# Patient Record
Sex: FEMALE | Race: WHITE | NOT HISPANIC OR LATINO | Employment: UNEMPLOYED | URBAN - METROPOLITAN AREA
[De-identification: names, ages, dates, MRNs, and addresses within clinical notes are randomized per-mention and may not be internally consistent; named-entity substitution may affect disease eponyms.]

---

## 2017-01-05 RX ORDER — GABAPENTIN 400 MG/1
CAPSULE ORAL
Qty: 90 CAPSULE | Refills: 0 | OUTPATIENT
Start: 2017-01-05

## 2017-03-29 ENCOUNTER — HOSPITAL ENCOUNTER (OUTPATIENT)
Dept: RADIOLOGY | Facility: HOSPITAL | Age: 27
Discharge: HOME | End: 2017-03-29
Attending: FAMILY MEDICINE
Payer: MEDICAID

## 2017-03-29 DIAGNOSIS — R19.7 DIARRHEA: ICD-10-CM

## 2017-03-29 DIAGNOSIS — R10.13 EPIGASTRIC PAIN: ICD-10-CM

## 2017-03-29 PROCEDURE — 74000 XR ABDOMEN AP 1 VIEW: CPT | Mod: TC

## 2017-03-29 PROCEDURE — 74000 XR ABDOMEN AP 1 VIEW: CPT | Mod: 26,,, | Performed by: RADIOLOGY

## 2017-03-29 PROCEDURE — 76705 ECHO EXAM OF ABDOMEN: CPT | Mod: TC

## 2017-03-29 PROCEDURE — 76705 ECHO EXAM OF ABDOMEN: CPT | Mod: 26,,, | Performed by: RADIOLOGY

## 2017-05-18 ENCOUNTER — LAB VISIT (OUTPATIENT)
Dept: LAB | Facility: HOSPITAL | Age: 27
End: 2017-05-18
Attending: FAMILY MEDICINE
Payer: MEDICAID

## 2017-05-18 DIAGNOSIS — M25.50 JOINT PAIN: Primary | ICD-10-CM

## 2017-05-18 PROCEDURE — 86141 C-REACTIVE PROTEIN HS: CPT

## 2017-05-18 PROCEDURE — 86160 COMPLEMENT ANTIGEN: CPT

## 2017-05-18 PROCEDURE — 36415 COLL VENOUS BLD VENIPUNCTURE: CPT

## 2017-05-18 PROCEDURE — 86038 ANTINUCLEAR ANTIBODIES: CPT

## 2017-05-18 PROCEDURE — 86039 ANTINUCLEAR ANTIBODIES (ANA): CPT

## 2017-05-18 PROCEDURE — 86235 NUCLEAR ANTIGEN ANTIBODY: CPT | Mod: 59

## 2017-05-18 PROCEDURE — 86160 COMPLEMENT ANTIGEN: CPT | Mod: 59

## 2017-05-18 PROCEDURE — 86200 CCP ANTIBODY: CPT

## 2017-05-19 LAB
ANA SER QL IF: POSITIVE
ANA TITR SER IF: NORMAL {TITER}
C3 SERPL-MCNC: 123 MG/DL
C4 SERPL-MCNC: 25 MG/DL
CCP AB SER IA-ACNC: 3.9 U/ML
CRP SERPL-MCNC: 8.83 MG/L

## 2017-05-22 LAB
ANTI SM ANTIBODY: 0.71 EU
ANTI SM/RNP ANTIBODY: 0.23 EU
ANTI-SM INTERPRETATION: NEGATIVE
ANTI-SM/RNP INTERPRETATION: NEGATIVE
ANTI-SSA ANTIBODY: 0.59 EU
ANTI-SSA INTERPRETATION: NEGATIVE
ANTI-SSB ANTIBODY: 0.39 EU
ANTI-SSB INTERPRETATION: NEGATIVE
DSDNA AB SER-ACNC: NORMAL [IU]/ML

## 2017-08-08 ENCOUNTER — HOSPITAL ENCOUNTER (OUTPATIENT)
Dept: RADIOLOGY | Facility: HOSPITAL | Age: 27
Discharge: HOME | End: 2017-08-08
Attending: FAMILY MEDICINE
Payer: MEDICAID

## 2017-08-08 DIAGNOSIS — M54.42 LUMBAGO WITH SCIATICA, LEFT SIDE: ICD-10-CM

## 2017-08-08 DIAGNOSIS — G90.09 IDIOPATHIC PERIPHERAL AUTONOMIC NEUROPATHY: ICD-10-CM

## 2017-08-08 DIAGNOSIS — M79.7 FIBROMYALGIA: ICD-10-CM

## 2017-08-08 DIAGNOSIS — M25.562 PAIN IN LEFT KNEE: ICD-10-CM

## 2017-08-08 DIAGNOSIS — G89.28 OTHER CHRONIC POSTOPERATIVE PAIN: ICD-10-CM

## 2017-08-08 DIAGNOSIS — R79.82 ELEVATED C-REACTIVE PROTEIN: ICD-10-CM

## 2017-08-08 DIAGNOSIS — M25.561 PAIN IN RIGHT KNEE: ICD-10-CM

## 2017-08-08 DIAGNOSIS — M25.512 PAIN IN LEFT SHOULDER: ICD-10-CM

## 2017-08-08 PROCEDURE — 73030 X-RAY EXAM OF SHOULDER: CPT | Mod: TC,LT

## 2017-08-08 PROCEDURE — 73030 X-RAY EXAM OF SHOULDER: CPT | Mod: 26,LT,, | Performed by: RADIOLOGY

## 2017-08-08 PROCEDURE — 72100 X-RAY EXAM L-S SPINE 2/3 VWS: CPT | Mod: 26,,, | Performed by: RADIOLOGY

## 2017-08-08 PROCEDURE — 72100 X-RAY EXAM L-S SPINE 2/3 VWS: CPT | Mod: TC

## 2017-08-08 PROCEDURE — 73560 X-RAY EXAM OF KNEE 1 OR 2: CPT | Mod: 50,TC

## 2017-08-08 PROCEDURE — 73560 X-RAY EXAM OF KNEE 1 OR 2: CPT | Mod: 26,50,, | Performed by: RADIOLOGY

## 2017-08-22 ENCOUNTER — OFFICE VISIT (OUTPATIENT)
Dept: OBSTETRICS AND GYNECOLOGY | Facility: CLINIC | Age: 27
End: 2017-08-22
Payer: MEDICAID

## 2017-08-22 VITALS
HEART RATE: 89 BPM | RESPIRATION RATE: 13 BRPM | SYSTOLIC BLOOD PRESSURE: 118 MMHG | WEIGHT: 145 LBS | HEIGHT: 63 IN | DIASTOLIC BLOOD PRESSURE: 76 MMHG | BODY MASS INDEX: 25.69 KG/M2

## 2017-08-22 DIAGNOSIS — Z12.4 CERVICAL CANCER SCREENING: ICD-10-CM

## 2017-08-22 DIAGNOSIS — N90.89 VULVAR LESION: ICD-10-CM

## 2017-08-22 DIAGNOSIS — Z01.419 WELL WOMAN EXAM WITH ROUTINE GYNECOLOGICAL EXAM: Primary | ICD-10-CM

## 2017-08-22 DIAGNOSIS — Z30.011 ENCOUNTER FOR INITIAL PRESCRIPTION OF CONTRACEPTIVE PILLS: ICD-10-CM

## 2017-08-22 DIAGNOSIS — Z11.3 SCREENING EXAMINATION FOR STD (SEXUALLY TRANSMITTED DISEASE): ICD-10-CM

## 2017-08-22 PROCEDURE — 88141 CYTOPATH C/V INTERPRET: CPT | Mod: ,,, | Performed by: PATHOLOGY

## 2017-08-22 PROCEDURE — 87591 N.GONORRHOEAE DNA AMP PROB: CPT

## 2017-08-22 PROCEDURE — 87660 TRICHOMONAS VAGIN DIR PROBE: CPT

## 2017-08-22 PROCEDURE — 87480 CANDIDA DNA DIR PROBE: CPT

## 2017-08-22 PROCEDURE — 88305 TISSUE EXAM BY PATHOLOGIST: CPT | Performed by: PATHOLOGY

## 2017-08-22 PROCEDURE — 99213 OFFICE O/P EST LOW 20 MIN: CPT | Mod: PBBFAC | Performed by: OBSTETRICS & GYNECOLOGY

## 2017-08-22 PROCEDURE — 88142 CYTOPATH C/V THIN LAYER: CPT | Performed by: PATHOLOGY

## 2017-08-22 PROCEDURE — 99395 PREV VISIT EST AGE 18-39: CPT | Mod: S$PBB,,, | Performed by: OBSTETRICS & GYNECOLOGY

## 2017-08-22 PROCEDURE — 99999 PR PBB SHADOW E&M-EST. PATIENT-LVL III: CPT | Mod: PBBFAC,,, | Performed by: OBSTETRICS & GYNECOLOGY

## 2017-08-22 RX ORDER — DEXTROAMPHETAMINE SACCHARATE, AMPHETAMINE ASPARTATE MONOHYDRATE, DEXTROAMPHETAMINE SULFATE AND AMPHETAMINE SULFATE 5; 5; 5; 5 MG/1; MG/1; MG/1; MG/1
20 CAPSULE, EXTENDED RELEASE ORAL EVERY MORNING
COMMUNITY
Start: 2017-08-08

## 2017-08-22 RX ORDER — CALC/MAG/B COMPLEX/D3/HERB 61
TABLET ORAL
COMMUNITY
Start: 2017-05-30 | End: 2017-12-26

## 2017-08-22 RX ORDER — GABAPENTIN 300 MG/1
CAPSULE ORAL
COMMUNITY
Start: 2017-08-11 | End: 2017-12-26

## 2017-08-22 RX ORDER — HYDROCODONE BITARTRATE AND ACETAMINOPHEN 7.5; 325 MG/1; MG/1
TABLET ORAL
COMMUNITY
Start: 2017-08-08 | End: 2017-12-26 | Stop reason: SDUPTHER

## 2017-08-22 RX ORDER — NALOXONE HYDROCHLORIDE 1 MG/ML
INJECTION INTRAMUSCULAR; INTRAVENOUS; SUBCUTANEOUS
COMMUNITY
Start: 2017-08-08 | End: 2018-02-02 | Stop reason: DRUGHIGH

## 2017-08-22 RX ORDER — DIPHENOXYLATE HYDROCHLORIDE AND ATROPINE SULFATE 2.5; .025 MG/1; MG/1
1 TABLET ORAL
COMMUNITY
Start: 2017-08-08

## 2017-08-22 RX ORDER — LEVONORGESTREL / ETHINYL ESTRADIOL AND ETHINYL ESTRADIOL 150-30(84)
1 KIT ORAL DAILY
Qty: 84 EACH | Refills: 3 | Status: SHIPPED | OUTPATIENT
Start: 2017-08-22 | End: 2018-04-09

## 2017-08-22 RX ORDER — PANTOPRAZOLE SODIUM 40 MG/1
TABLET, DELAYED RELEASE ORAL
COMMUNITY
Start: 2017-08-11 | End: 2017-12-26

## 2017-08-22 RX ORDER — HYDROCODONE BITARTRATE AND ACETAMINOPHEN 5; 325 MG/1; MG/1
TABLET ORAL
COMMUNITY
Start: 2017-05-18 | End: 2017-08-22

## 2017-08-22 RX ORDER — SERTRALINE HYDROCHLORIDE 100 MG/1
200 TABLET, FILM COATED ORAL DAILY
COMMUNITY
Start: 2017-08-15 | End: 2018-04-09

## 2017-08-22 NOTE — PROGRESS NOTES
Subjective:    Patient ID: Florence Hinkle is a 26 y.o. female.     Chief Complaint: Annual Well Woman Exam     History of Present Illness:  Florence presents today for Annual Well Woman exam. She describes her menses as irregular. She also states that her menstrual flow is normal with minimal cramping. She denies pelvic pain.  She has some vulvar lesions that are concerning to her that she would like to be evaluated.  She tried valtrex for them without any improvement.  Denies pain or itching at lesions.  She desires STD screening.  She denies breast tenderness, masses, nipple discharge.  She reports no problems with urination. Bowel movements have not significantly changed. She is sexually active. She desires to restart seasonique for contraception.    Past Medical History:   Diagnosis Date    Arthritis     Fibromyalgia     Mental disorder     Neuropathic pain     Lower extremities left hip and knee    Stress     Anxiety     Past Surgical History:   Procedure Laterality Date    KNEE SURGERY  2012    left-arthroscopy shave knee cap and debride    MOUTH SURGERY  11/2006    [place] teeth removed     Review of patient's allergies indicates:   Allergen Reactions    Sulfa (sulfonamide antibiotics) Hives, Nausea And Vomiting and Other (See Comments)     Weakness, dizziness, whole body hurt, needed help getting up    Pcn [penicillins] Other (See Comments)     Since a child     Current Outpatient Prescriptions on File Prior to Visit   Medication Sig Dispense Refill    clonazepam (KLONOPIN) 1 MG tablet Take 1 mg by mouth 2 (two) times daily as needed.       cyclobenzaprine (FLEXERIL) 10 MG tablet TAKE 1 TABLET BY MOUTH 3 TIMES A DAY AS NEEDED FOR MUSCLE SPASM 90 tablet 2    valacyclovir (VALTREX) 1000 MG tablet Take 1 tablet by mouth as needed.      [DISCONTINUED] DEXILANT 60 mg capsule Take 1 capsule by mouth once daily.  2    [DISCONTINUED] HORIZANT 600 mg TbSR Take 1 tablet by mouth every evening.  2     [DISCONTINUED] NEXPLANON 68 mg Impl Every 3 years      [DISCONTINUED] sertraline (ZOLOFT) 50 MG tablet Take 100 mg by mouth nightly.  0     No current facility-administered medications on file prior to visit.      Social History   Substance Use Topics    Smoking status: Current Every Day Smoker     Packs/day: 0.50     Years: 6.00     Types: Cigarettes     Start date: 2008    Smokeless tobacco: Never Used    Alcohol use Yes      Comment: Socially-not every week     Family History   Problem Relation Age of Onset    Breast cancer Neg Hx     Colon cancer Neg Hx     Ovarian cancer Neg Hx      The following portions of the patient's history were reviewed and updated as appropriate: allergies, current medications, past family history, past medical history, past social history, past surgical history and problem list.      Menstrual History:   Patient's last menstrual period was 08/15/2017 (approximate)..     OB History      Para Term  AB Living    0              SAB TAB Ectopic Multiple Live Births                         ROS:   CONSTITUTIONAL: Negative for fever, chills, diaphoresis, weakness, fatigue, weight loss, weight gain  ENT: negative for sore throat, nasal congestion, nasal discharge, epistaxis, tinnitus, hearing loss  EYES: negative for blurry vision, decreased vision, loss of vision, eye pain, diplopia, photophobia, discharge  SKIN: Negative for rash, itching, hives  RESPIRATORY: negative for cough, hemoptysis, shortness of breath, pleuritic chest pain, wheezing  CARDIOVASCULAR: negative for chest pain, dyspnea on exertion, orthopnea, paroxysmal nocturnal dyspnea, edema, palpitations  BREAST: negative for breast  tenderness, breast mass, nipple discharge, or skin changes  GASTROINTESTINAL: negative for abdominal pain, flank pain, nausea, vomiting, diarrhea, constipation, black stool, blood in stool  GENITOURINARY: positive for vulvar lesion;  negative for abnormal vaginal bleeding,  amenorrhea, decreased libido, dysuria, genital sores, hematuria, incontinence, menorrhagia, pelvic pain, urinary frequency, vaginal discharge  HEMATOLOGIC/LYMPHATIC: negative for swollen lymph nodes, bleeding, bruising  MUSCULOSKELETAL: negative for back pain, joint pain, joint stiffness, joint swelling, muscle pain, muscle weakness  NEUROLOGICAL: negative for dizzy/vertigo, headache, focal weakness, numbness/tingling, speech problems, loss of consciousness, confusion, memory loss  BEHAVORIAL/PSYCH: negative for depression, anxiety, bipolar disorder, ADD, substance abuse, schizophrenia  ENDOCRINE: negative for polydipsia/polyuria, palpitations, skin changes, temperature intolerance, unexpected weight changes  ALLERGIC/IMMUNOLOGIC: negative for urticaria, hay fever, angioedema      Objective:    Vital Signs:  Vitals:    08/22/17 1504   BP: 118/76   Pulse: 89   Resp: 13       Physical Exam:  General:  alert; oriented x 4;l well-nourished female   Skin:  Skin color, texture, turgor normal. No rashes or lesions   HEENT:  conjunctivae/corneas clear.    Neck: supple, trachea midline   Respiratory:  clear to auscultation bilaterally   Heart:  regular rate and rhythm, S1, S2 normal, no murmur, click, rub or gallop   Breasts: Symmetrical;  Nipples are protruding and have no nipple discharge. No palpable masses, erythema, skin changes, tenderness, or adenopathy.   Abdomen:  soft, non-tender. Bowel sounds normal. No masses,  no organomegaly   Pelvis: External genitalia: normal general appearance; small collection of flesh colored, nontender left posterior labial/vulvar lesions; hyperpigmented right perineal lesion (chronic as per patient)  Urinary system: urethral meatus normal, bladder nontender  Vaginal: normal mucosa without prolapse or lesions  Cervix: normal appearance; nontender  Uterus: normal single, nontender  Adnexa: normal bimanual exam; nontender; no palpable masses   Extremities: Normal ROM; no edema, no cyanosis    Neurologial: Normal strength and tone. No focal numbness or weakness. Reflexes 2+ and equal.   Psychiatric: normal mood, speech, dress, and thought processes     Procedure:  Left vulvar lesions prepped with hibiclens and anesthetized with 1.5 cc of 1% lidocaine.  Scalpel used to excise lesions which were then sent to pathology.  Silver nitrate used for hemostasis.  Pt tolerated well.    Assessment:      1. Well woman exam with routine gynecological exam    2. Cervical cancer screening    3. Screening examination for STD (sexually transmitted disease)    4. Vulvar lesion    5. Encounter for initial prescription of contraceptive pills          Plan:      Well woman exam with routine gynecological exam    Cervical cancer screening  -     Liquid-based pap smear, screening    Screening examination for STD (sexually transmitted disease)  -     HIV-1 and HIV-2 antibodies; Future; Expected date: 08/22/2017  -     Hepatitis panel, acute; Future; Expected date: 08/22/2017  -     RPR; Future; Expected date: 08/22/2017  -     Herpes simplex type 1 & 2 IgM,Herpes IgM; Future; Expected date: 08/22/2017  -     Herpes simplex type 1&2 IgG,Herpes titer; Future; Expected date: 08/22/2017  -     Vaginosis Screen by DNA Probe  -     C. trachomatis/N. gonorrhoeae by AMP DNA Cervicovaginal    Vulvar lesion  -     Tissue Specimen To Pathology, Obstetrics/Gynecology    Encounter for initial prescription of contraceptive pills  -     L norgest/e.estradiol-e.estrad 0.15 mg-30 mcg (84)/10 mcg (7) 3MPk; Take 1 tablet by mouth once daily.  Dispense: 84 each; Refill: 3        COUNSELING:  Florence was counseled on use and side-effects of various contraceptive measures, A.C.O.G. Pap guidelines and recommendations for yearly pelvic exams in addition to recommendations for monthly self breast exams; to see her PCP for other health maintenance.

## 2017-08-24 LAB
C TRACH DNA SPEC QL NAA+PROBE: NOT DETECTED
CANDIDA RRNA VAG QL PROBE: NEGATIVE
G VAGINALIS RRNA GENITAL QL PROBE: NEGATIVE
N GONORRHOEA DNA SPEC QL NAA+PROBE: NOT DETECTED
T VAGINALIS RRNA GENITAL QL PROBE: NEGATIVE

## 2017-12-26 ENCOUNTER — PROCEDURE VISIT (OUTPATIENT)
Dept: OBSTETRICS AND GYNECOLOGY | Facility: CLINIC | Age: 27
End: 2017-12-26
Payer: MEDICAID

## 2017-12-26 VITALS
HEIGHT: 63 IN | BODY MASS INDEX: 25.87 KG/M2 | WEIGHT: 146 LBS | HEART RATE: 71 BPM | SYSTOLIC BLOOD PRESSURE: 110 MMHG | DIASTOLIC BLOOD PRESSURE: 68 MMHG | RESPIRATION RATE: 13 BRPM

## 2017-12-26 DIAGNOSIS — R87.611 ATYPICAL SQUAMOUS CELLS CANNOT EXCLUDE HIGH GRADE SQUAMOUS INTRAEPITHELIAL LESION ON CYTOLOGIC SMEAR OF CERVIX (ASC-H): Primary | ICD-10-CM

## 2017-12-26 DIAGNOSIS — Z01.812 PRE-PROCEDURE LAB EXAM: ICD-10-CM

## 2017-12-26 LAB
B-HCG UR QL: NEGATIVE
CTP QC/QA: YES

## 2017-12-26 PROCEDURE — 57454 BX/CURETT OF CERVIX W/SCOPE: CPT | Mod: PBBFAC | Performed by: OBSTETRICS & GYNECOLOGY

## 2017-12-26 PROCEDURE — 81025 URINE PREGNANCY TEST: CPT | Mod: PBBFAC | Performed by: OBSTETRICS & GYNECOLOGY

## 2017-12-26 PROCEDURE — 88305 TISSUE EXAM BY PATHOLOGIST: CPT | Mod: 26,,, | Performed by: PATHOLOGY

## 2017-12-26 PROCEDURE — 88305 TISSUE EXAM BY PATHOLOGIST: CPT | Performed by: PATHOLOGY

## 2017-12-26 PROCEDURE — 57454 BX/CURETT OF CERVIX W/SCOPE: CPT | Mod: S$PBB,,, | Performed by: OBSTETRICS & GYNECOLOGY

## 2017-12-26 RX ORDER — CETIRIZINE HYDROCHLORIDE 10 MG/1
10 TABLET ORAL NIGHTLY
COMMUNITY
Start: 2017-12-04

## 2017-12-26 RX ORDER — METRONIDAZOLE 500 MG/1
TABLET ORAL
COMMUNITY
Start: 2017-11-16 | End: 2017-12-26

## 2017-12-26 RX ORDER — DEXLANSOPRAZOLE 60 MG/1
60 CAPSULE, DELAYED RELEASE ORAL DAILY
COMMUNITY
Start: 2017-12-04

## 2017-12-26 RX ORDER — HYDROCODONE BITARTRATE AND ACETAMINOPHEN 7.5; 325 MG/15ML; MG/15ML
SOLUTION ORAL
Refills: 0 | COMMUNITY
Start: 2017-11-30 | End: 2018-02-02

## 2017-12-26 NOTE — PROCEDURES
COLPOSCOPY EXAM:     Florence is a  27 y.o. female  who presents for colposcopy following a PAP smear  Done on 17 revealing ASCUS-cannot exclude HSIL.  UPT negative.    TIME OUT PERFORMED.   The abnormal PAP findings were discussed, as well as HPV infection, need for colposcopy and possible biopsies to determine the plan of care, treatments available, the minimal risk of bleeding and infection with colposcopy, and alternatives to colposcopy. She verbalized her understanding and agreed to proceed.      COLPOSCOPIC FINDINGS:  There were no vulvar lesions suggestive of condyloma present.  The cervix was visualized with a speculum. Acetic acid was applied.     Colposcopic exam revealed acetowhite lesion(s) noted at 12-1 o'clock and punctation noted at 8 o'clock     Biopsy was performed at 8 o'clock and 12 o'clock     ECC was performed.    Specimens obtained were submitted to pathology for evaluation.  The speculum was removed. The patient tolerated the procedure well.      Atypical squamous cells cannot exclude high grade squamous intraepithelial lesion on cytologic smear of cervix (ASC-H)  -     Colposcopy W/BIOPSY AND ECC- Today  -     Tissue Specimen To Pathology, Obstetrics/Gynecology  -     Tissue Specimen To Pathology, Obstetrics/Gynecology  -     Tissue Specimen To Pathology, Obstetrics/Gynecology    Pre-procedure lab exam  -     POCT urine pregnancy    Other orders  -     Cancel: Colposcopy; Future      PLAN:    POST COLPOSCOPY COUNSELING:   Manage post colposcopy cramping with NSAIDs, Tylenol..  Avoid anything in vagina (intercourse, douching, tampons) one week after the procedure.  Expect a clumpy blackish vaginal discharge (Monsel's solution) for several days.  Report bleeding heavier than a period, worsening pain, fever > 101.0 F, or foul-smelling vaginal discharge.  HPV vaccine recommended according to FDA age guidelines.  Importance of follow-up stressed.    Counseling lasted approximately 15  minutes and all her questions were answered.    FOLLOW-UP:   Follow up with me: to be determined based upon pathology report.

## 2018-01-04 ENCOUNTER — PATIENT MESSAGE (OUTPATIENT)
Dept: OBSTETRICS AND GYNECOLOGY | Facility: CLINIC | Age: 28
End: 2018-01-04

## 2018-01-04 ENCOUNTER — TELEPHONE (OUTPATIENT)
Dept: OBSTETRICS AND GYNECOLOGY | Facility: CLINIC | Age: 28
End: 2018-01-04

## 2018-01-04 NOTE — TELEPHONE ENCOUNTER
----- Message from Chantel Robbins LPN sent at 1/3/2018  4:33 PM CST -----      ----- Message -----  From: Esperanza Sifuentes MD  Sent: 1/3/2018   4:33 PM  To: [facility] Clinical Staff    Please notify patient that her biopsies revealed mild dysplasia (CIN2) and her endocervical curettage was negative. She will need to be scheduled for a LEEP.

## 2018-01-25 ENCOUNTER — TELEPHONE (OUTPATIENT)
Dept: OBSTETRICS AND GYNECOLOGY | Facility: CLINIC | Age: 28
End: 2018-01-25

## 2018-01-25 NOTE — TELEPHONE ENCOUNTER
I left a message for the patient to call the clinic so I can reschedule her appointment on 2/19/2018 for the LEEP with Dr. Sifuentes. I left a message for the patient to ask for me.

## 2018-02-02 ENCOUNTER — OFFICE VISIT (OUTPATIENT)
Dept: NEUROLOGY | Facility: CLINIC | Age: 28
End: 2018-02-02
Payer: MEDICAID

## 2018-02-02 VITALS
BODY MASS INDEX: 26.49 KG/M2 | HEART RATE: 82 BPM | HEIGHT: 62 IN | DIASTOLIC BLOOD PRESSURE: 66 MMHG | RESPIRATION RATE: 14 BRPM | SYSTOLIC BLOOD PRESSURE: 94 MMHG | WEIGHT: 143.94 LBS

## 2018-02-02 DIAGNOSIS — G89.29 OTHER CHRONIC PAIN: ICD-10-CM

## 2018-02-02 DIAGNOSIS — J34.89 NASAL PAIN: ICD-10-CM

## 2018-02-02 DIAGNOSIS — F07.81 POST CONCUSSION SYNDROME: Primary | ICD-10-CM
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
## 2018-02-02 PROCEDURE — 3008F BODY MASS INDEX DOCD: CPT | Mod: ,,, | Performed by: PSYCHIATRY & NEUROLOGY

## 2018-02-02 PROCEDURE — 99213 OFFICE O/P EST LOW 20 MIN: CPT | Mod: PBBFAC | Performed by: PSYCHIATRY & NEUROLOGY

## 2018-02-02 PROCEDURE — 99204 OFFICE O/P NEW MOD 45 MIN: CPT | Mod: S$PBB,,, | Performed by: PSYCHIATRY & NEUROLOGY

## 2018-02-02 PROCEDURE — 99999 PR PBB SHADOW E&M-EST. PATIENT-LVL III: CPT | Mod: PBBFAC,,, | Performed by: PSYCHIATRY & NEUROLOGY

## 2018-02-02 RX ORDER — HYDROCODONE BITARTRATE AND ACETAMINOPHEN 7.5; 325 MG/1; MG/1
1 TABLET ORAL EVERY 6 HOURS PRN
COMMUNITY
Start: 2018-01-29

## 2018-02-02 RX ORDER — NORTRIPTYLINE HYDROCHLORIDE 10 MG/1
10 CAPSULE ORAL NIGHTLY
Qty: 30 CAPSULE | Refills: 11 | Status: SHIPPED | OUTPATIENT
Start: 2018-02-02 | End: 2018-04-09

## 2018-02-02 RX ORDER — ONDANSETRON 4 MG/1
TABLET, ORALLY DISINTEGRATING ORAL
COMMUNITY
Start: 2018-01-02 | End: 2018-02-02

## 2018-02-02 RX ORDER — MONTELUKAST SODIUM 10 MG/1
10 TABLET ORAL NIGHTLY
COMMUNITY
Start: 2018-01-04

## 2018-02-02 NOTE — PROGRESS NOTES
HPI: Florence Hinkle is a 27 y.o. female known to me in 2014 for history of left leg numbness (hip to foot numbness). Minimal degenerative changes in the L spine.    Prior EMG/NCS for this complaint was normal.     Patient last saw me in 2014  She reports she had a tonsillectomy a month ago.  She states she was weak, not eating well. She a no supper then a few drinks 9 days ago, then woke the next day, hot sweating nauseated and dizzy.  She ambulated to the bathroom and then had an LOC after feeling room spinning on the toilet. She collapsed and hit her nose and had bleeding. She was unconscious for 10 seconds.  She did not report to the ER. She  nose pain and no more bleeding. She had bruising under her left eye and since has felt tired.  She gets dizzy if she moves to fast. She has headaches frontally. These are not severe but improving slowly with time.   She has concentration difficulty.  She does not work/ currently is unemployed.  For symptoms, she uses rare OTC meds.    Her leg pain, which she saw me for prior is worse at times compared to others. She was placed on gabapentin after insurance denied Horizant. She was off all meds for a few years, then represented to PCP for her prior pain- PCP is filling narco for pain which helps pain but not her burning and numbness in the left leg.   She reports + ROMÁN and + inflammatory markers.   Thumb numbness post bowling years ago stopped  She drinks alcohol 1 day per week and usually drinks at least 3 drinks per week.     Review of Systems   Constitutional: Negative for fever.   HENT: Negative for nosebleeds.    Eyes: Negative for double vision.   Respiratory: Negative for hemoptysis.    Cardiovascular: Negative for leg swelling.   Gastrointestinal: Negative for blood in stool.   Genitourinary: Negative for hematuria.   Musculoskeletal: Positive for back pain.        Still has chronic lower back pain   Skin: Negative for rash.   Neurological: Positive for dizziness and  headaches.   Psychiatric/Behavioral: Negative for depression. The patient has insomnia.         Zoloft helps depression and anxiety         Exam:  Gen Appearance, well developed/nourished in no apparent distress  CV: 2+ distal pulses with no edema or swelling  Neuro:  MS: Awake, alert,  Sustains attention. Recent/remote memory intact, Language is full to spontaneous speech/comprehension. Fund of Knowledge is full  CN:  PERRL, Extraoccular movements and visual fields are full. Normal facial strength,  Tongue and Palate are midline and strong. Shoulder Shrug symmetric and strong.  Motor: Normal bulk, tone, no abnormal movements. 5/5 strength bilateral upper/lower extremities with 2+ reflexes and bilateral plantar response  Sensory: symmetric to light touch, pain, temp, and vibration/proprioception. Romberg negative  Cerebellar: Finger-nose,Rapid alternating movements intact  Gait: Normal stance, no ataxia  No discoloration of the foot and no edema or allodynia    Labs: CK repeated with Dr Navarro is normal and ROMÁN profile was normal after initially first positive ROMÁN      Assessment/Plan: Florence Hinkle is a 27 y.o. female known to me in 2014 for history of left leg numbness (hip to foot numbness). Minimal degenerative changes in the L spine.    Prior EMG/NCS for this complaint was normal.   She represents with post concussive syndrome and nasal painafter fall vs syncope after what sounds like dehydration/intoxication  Over 1 week ago  I recommend:     1. CT head and face to be sure no structural lesions after fall  2. She knows to reduce binge drinking and stay hydrated  3. Discussed post concussive symptoms, which should improve over time.   4. Offered her another medication for headache prevention and for there other more chronic pain. Pamelor trial (lower dose given zoloft use) per orders. She is aware take as directed only to avoid serotonin symptoms.   5. PCP is now filling her chronic pain medication for her  chronic pain  6. Previously: Rheumatology consult ruled out Fibromyalgia and suggests Complex regional Pain/ RSD which I think is a good consideration, although she has other areas of pain and lacks any discoloration or allodynia; however, patient could not get block approved by insurance with pain management/ this was for diagnostic purposes. PCP recommended another rheumatology consult given her reported positive ROMÁN noted by PCP per her    RTC in 6 weeks.

## 2018-02-09 ENCOUNTER — HOSPITAL ENCOUNTER (OUTPATIENT)
Dept: RADIOLOGY | Facility: HOSPITAL | Age: 28
Discharge: HOME | End: 2018-02-09
Attending: PSYCHIATRY & NEUROLOGY
Payer: MEDICAID
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
## 2018-02-09 DIAGNOSIS — F07.81 POST CONCUSSION SYNDROME: ICD-10-CM

## 2018-02-09 DIAGNOSIS — J34.89 NASAL PAIN: ICD-10-CM

## 2018-02-09 PROCEDURE — 70486 CT MAXILLOFACIAL W/O DYE: CPT | Mod: TC

## 2018-02-09 PROCEDURE — 70450 CT HEAD/BRAIN W/O DYE: CPT | Mod: 26,,, | Performed by: RADIOLOGY

## 2018-02-09 PROCEDURE — 70450 CT HEAD/BRAIN W/O DYE: CPT | Mod: TC

## 2018-02-09 PROCEDURE — 70486 CT MAXILLOFACIAL W/O DYE: CPT | Mod: 26,,, | Performed by: RADIOLOGY

## 2018-02-26 ENCOUNTER — TELEPHONE (OUTPATIENT)
Dept: OBSTETRICS AND GYNECOLOGY | Facility: CLINIC | Age: 28
End: 2018-02-26

## 2018-02-26 NOTE — TELEPHONE ENCOUNTER
----- Message from Renetta Santiago MA sent at 2/26/2018 10:10 AM CST -----  Contact: self  Florence Hinkle  MRN: [id number]  Home Phone      [phone]  Work Phone      Not on file.  Mobile          [phone]    Patient Care Team:  Freda Baltazar MD as PCP - General (Family Medicine)  OB? No  What phone number can you be reached at?  [phone]  Message:   Needs to discuss procedure that is scheduled for Wednesday.

## 2018-02-26 NOTE — TELEPHONE ENCOUNTER
Patient scheduled for LEEP procedure and inquiring if recovery is about the same as when she had previous cervical biopsy done. Patient instructed that it would be about the same. Patient inquiring if she would be able to return to work the next day only to take computer courses. Patient instructed that she should be able to as long as she feels well enough. Patient verbalized understanding.

## 2018-02-28 ENCOUNTER — PROCEDURE VISIT (OUTPATIENT)
Dept: OBSTETRICS AND GYNECOLOGY | Facility: CLINIC | Age: 28
End: 2018-02-28
Payer: MEDICAID

## 2018-02-28 VITALS
HEART RATE: 80 BPM | DIASTOLIC BLOOD PRESSURE: 70 MMHG | RESPIRATION RATE: 18 BRPM | BODY MASS INDEX: 26.58 KG/M2 | HEIGHT: 63 IN | SYSTOLIC BLOOD PRESSURE: 118 MMHG | WEIGHT: 150 LBS

## 2018-02-28 DIAGNOSIS — R87.610 PAP SMEAR WITH ATYPICAL SQUAMOUS CELLS, CANNOT EXCLUDE HIGH GRADE SQUAMOUS INTRAEPITHELIAL LESION (ASC-H): ICD-10-CM

## 2018-02-28 DIAGNOSIS — R10.2 VAGINAL PAIN: ICD-10-CM

## 2018-02-28 DIAGNOSIS — R68.82 DECREASED LIBIDO: ICD-10-CM

## 2018-02-28 DIAGNOSIS — N89.8 VAGINAL DISCHARGE: ICD-10-CM

## 2018-02-28 DIAGNOSIS — N76.0 ACUTE VAGINITIS: ICD-10-CM

## 2018-02-28 DIAGNOSIS — N87.1 MODERATE DYSPLASIA OF CERVIX (CIN II): Primary | ICD-10-CM

## 2018-02-28 DIAGNOSIS — Z01.812 PRE-PROCEDURE LAB EXAM: ICD-10-CM

## 2018-02-28 LAB
B-HCG UR QL: NEGATIVE
CTP QC/QA: YES

## 2018-02-28 PROCEDURE — 87491 CHLMYD TRACH DNA AMP PROBE: CPT

## 2018-02-28 PROCEDURE — 87480 CANDIDA DNA DIR PROBE: CPT

## 2018-02-28 PROCEDURE — 99213 OFFICE O/P EST LOW 20 MIN: CPT | Mod: S$PBB,,, | Performed by: OBSTETRICS & GYNECOLOGY

## 2018-02-28 PROCEDURE — 81025 URINE PREGNANCY TEST: CPT | Mod: PBBFAC | Performed by: OBSTETRICS & GYNECOLOGY

## 2018-03-01 LAB
C TRACH DNA SPEC QL NAA+PROBE: NOT DETECTED
CANDIDA RRNA VAG QL PROBE: NEGATIVE
G VAGINALIS RRNA GENITAL QL PROBE: NEGATIVE
N GONORRHOEA DNA SPEC QL NAA+PROBE: NOT DETECTED
T VAGINALIS RRNA GENITAL QL PROBE: POSITIVE

## 2018-03-01 NOTE — PROGRESS NOTES
Subjective:    Patient ID: Florence Hinkle is a 27 y.o. y.o. female.     Chief Complaint:   Chief Complaint   Patient presents with    Procedure     LEEP       History of Present Illness   Florence presents today for LEEP secondary to ASCUS, cannot exclude HSIL pap in 8/17 and TYLER II on colposcopic biopsy in 12/17.  She reports she has recently been having vaginal discharge with odor and would like to be evaluated for this.  States she has also noted vaginal pain.   She also reports decreased libido which she is unsure if this is related to all of her medications collectively or simply to her contraception or some other reason.  States she has tried various forms of contraception in the past.  Would like to have her hormone levels checked.     Review of Systems   Genitourinary: Positive for decreased libido, vaginal discharge and vaginal pain.   All other systems reviewed and are negative.        Objective:    Vital Signs:  Vitals:    02/28/18 1546   BP: 118/70   Pulse: 80   Resp: 18       Physical Exam:  General:  alert; oriented; well-nourished female   Skin:  Skin color, texture, turgor normal. No rashes or lesions   Abdomen:  soft, non-tender. Bowel sounds normal. No masses,  no organomegaly   Pelvis: External genitalia: normal general appearance  Urinary system: urethral meatus normal, bladder nontender  Vaginal: normal mucosa without prolapse or lesions; minimal amount of white vaginal discharge present  Cervix: normal appearance grossly       Pt reported severe pain in vagina after speculum insertion and stated she could not proceed with any further examination or procedure at this time.  Reports she was having pain despite having taken Norco 7.5 mg prior to coming to the appointment. Remainder of examination and LEEP procedure aborted.    Assessment:      1. Moderate dysplasia of cervix (TYLER II)    2. Pap smear with atypical squamous cells, cannot exclude high grade squamous intraepithelial lesion (ASC-H)     3. Pre-procedure lab exam    4. Decreased libido    5. Acute vaginitis    6. Vaginal pain    7. Vaginal discharge          Plan:      Moderate dysplasia of cervix (TYLER II)    Pap smear with atypical squamous cells, cannot exclude high grade squamous intraepithelial lesion (ASC-H)    Pre-procedure lab exam  -     POCT urine pregnancy    Decreased libido  -     TSH; Future; Expected date: 02/28/2018  -     Follicle stimulating hormone; Future; Expected date: 02/28/2018  -     Estradiol; Future; Expected date: 02/28/2018  -     Luteinizing hormone; Future; Expected date: 02/28/2018    Acute vaginitis  -     Vaginosis Screen by DNA Probe  -     C. trachomatis/N. gonorrhoeae by AMP DNA Cervix    Vaginal pain  -     Vaginosis Screen by DNA Probe  -     C. trachomatis/N. gonorrhoeae by AMP DNA Cervix    Vaginal discharge  -     Vaginosis Screen by DNA Probe  -     C. trachomatis/N. gonorrhoeae by AMP DNA Cervix    Will follow up on testing and manage accordingly.  Pt to reschedule LEEP after vaginitis resolves.

## 2018-03-02 ENCOUNTER — TELEPHONE (OUTPATIENT)
Dept: OBSTETRICS AND GYNECOLOGY | Facility: CLINIC | Age: 28
End: 2018-03-02

## 2018-03-02 RX ORDER — METRONIDAZOLE 500 MG/1
2000 TABLET ORAL ONCE
Qty: 4 TABLET | Refills: 0 | Status: SHIPPED | OUTPATIENT
Start: 2018-03-02 | End: 2018-03-02

## 2018-03-02 NOTE — TELEPHONE ENCOUNTER
----- Message from Renetta Santiago MA sent at 3/2/2018 10:08 AM CST -----  Contact: self  Florence Hinkle  MRN: [id number]  Home Phone      [phone]  Work Phone      Not on file.  Mobile          [phone]    Patient Care Team:  Freda Baltazar MD as PCP - General (Family Medicine)  OB? No  What phone number can you be reached at?  [phone]  Message:   Would like results of swab.

## 2018-03-02 NOTE — TELEPHONE ENCOUNTER
Patient is requesting result of vaginal cultures. Dr. Sifuentes is out of the office today. Please advise.

## 2018-03-06 DIAGNOSIS — Z20.2 STD EXPOSURE: Primary | ICD-10-CM

## 2018-03-07 DIAGNOSIS — D06.9 SEVERE DYSPLASIA OF CERVIX (CIN III): Primary | ICD-10-CM

## 2018-03-08 ENCOUNTER — TELEPHONE (OUTPATIENT)
Dept: OBSTETRICS AND GYNECOLOGY | Facility: CLINIC | Age: 28
End: 2018-03-08

## 2018-03-22 ENCOUNTER — LAB VISIT (OUTPATIENT)
Dept: LAB | Facility: HOSPITAL | Age: 28
End: 2018-03-22
Attending: OBSTETRICS & GYNECOLOGY
Payer: MEDICAID

## 2018-03-22 DIAGNOSIS — R68.82 DECREASED LIBIDO: ICD-10-CM

## 2018-03-22 DIAGNOSIS — Z20.2 STD EXPOSURE: ICD-10-CM

## 2018-03-22 LAB — TSH SERPL DL<=0.005 MIU/L-ACNC: 0.84 UIU/ML

## 2018-03-22 PROCEDURE — 86694 HERPES SIMPLEX NES ANTBDY: CPT

## 2018-03-22 PROCEDURE — 82670 ASSAY OF TOTAL ESTRADIOL: CPT

## 2018-03-22 PROCEDURE — 86592 SYPHILIS TEST NON-TREP QUAL: CPT

## 2018-03-22 PROCEDURE — 83002 ASSAY OF GONADOTROPIN (LH): CPT

## 2018-03-22 PROCEDURE — 86703 HIV-1/HIV-2 1 RESULT ANTBDY: CPT

## 2018-03-22 PROCEDURE — 83001 ASSAY OF GONADOTROPIN (FSH): CPT

## 2018-03-22 PROCEDURE — 36415 COLL VENOUS BLD VENIPUNCTURE: CPT

## 2018-03-22 PROCEDURE — 84443 ASSAY THYROID STIM HORMONE: CPT

## 2018-03-22 PROCEDURE — 80074 ACUTE HEPATITIS PANEL: CPT

## 2018-03-23 LAB
ESTRADIOL SERPL-MCNC: 19 PG/ML
FSH SERPL-ACNC: 7.7 MIU/ML
HAV IGM SERPL QL IA: NEGATIVE
HBV CORE IGM SERPL QL IA: NEGATIVE
HBV SURFACE AG SERPL QL IA: NEGATIVE
HCV AB SERPL QL IA: NEGATIVE
HIV 1+2 AB+HIV1 P24 AG SERPL QL IA: NEGATIVE
LH SERPL-ACNC: 6.3 MIU/ML
RPR SER QL: NORMAL

## 2018-03-25 LAB — HSV AB, IGM BY EIA: NEGATIVE

## 2018-04-09 ENCOUNTER — HOSPITAL ENCOUNTER (OUTPATIENT)
Dept: PREADMISSION TESTING | Facility: HOSPITAL | Age: 28
Discharge: HOME | End: 2018-04-09
Attending: OBSTETRICS & GYNECOLOGY
Payer: MEDICAID

## 2018-04-09 ENCOUNTER — OFFICE VISIT (OUTPATIENT)
Dept: OBSTETRICS AND GYNECOLOGY | Facility: CLINIC | Age: 28
End: 2018-04-09
Payer: MEDICAID

## 2018-04-09 ENCOUNTER — ANESTHESIA EVENT (OUTPATIENT)
Dept: SURGERY | Facility: HOSPITAL | Age: 28
End: 2018-04-09
Payer: MEDICAID

## 2018-04-09 VITALS
WEIGHT: 153 LBS | BODY MASS INDEX: 27.11 KG/M2 | HEART RATE: 98 BPM | SYSTOLIC BLOOD PRESSURE: 119 MMHG | DIASTOLIC BLOOD PRESSURE: 72 MMHG | HEIGHT: 63 IN | RESPIRATION RATE: 16 BRPM

## 2018-04-09 DIAGNOSIS — Z30.011 ENCOUNTER FOR INITIAL PRESCRIPTION OF CONTRACEPTIVE PILLS: ICD-10-CM

## 2018-04-09 DIAGNOSIS — Z01.818 PREOPERATIVE TESTING: ICD-10-CM

## 2018-04-09 DIAGNOSIS — N87.1 MODERATE DYSPLASIA OF CERVIX (CIN II): Primary | ICD-10-CM

## 2018-04-09 PROCEDURE — 99213 OFFICE O/P EST LOW 20 MIN: CPT | Mod: PBBFAC | Performed by: OBSTETRICS & GYNECOLOGY

## 2018-04-09 PROCEDURE — 99999 PR PBB SHADOW E&M-EST. PATIENT-LVL III: CPT | Mod: PBBFAC,,, | Performed by: OBSTETRICS & GYNECOLOGY

## 2018-04-09 PROCEDURE — 99499 UNLISTED E&M SERVICE: CPT | Mod: S$PBB,,, | Performed by: OBSTETRICS & GYNECOLOGY

## 2018-04-09 RX ORDER — METRONIDAZOLE 500 MG/1
500 TABLET ORAL EVERY 12 HOURS
Qty: 14 TABLET | Refills: 0 | Status: SHIPPED | OUTPATIENT
Start: 2018-04-09 | End: 2018-04-16

## 2018-04-09 RX ORDER — NORGESTIMATE AND ETHINYL ESTRADIOL 0.25-0.035
1 KIT ORAL DAILY
Qty: 84 TABLET | Refills: 3 | Status: SHIPPED | OUTPATIENT
Start: 2018-04-09 | End: 2019-04-09

## 2018-04-09 RX ORDER — IBUPROFEN 800 MG/1
800 TABLET ORAL EVERY 8 HOURS PRN
Qty: 30 TABLET | Refills: 0 | Status: SHIPPED | OUTPATIENT
Start: 2018-04-09 | End: 2019-04-09

## 2018-04-09 NOTE — ANESTHESIA PREPROCEDURE EVALUATION
04/09/2018  Florence Hinkle is a 27 y.o., female.    Anesthesia Evaluation    I have reviewed the Patient Summary Reports.    I have reviewed the Nursing Notes.   I have reviewed the Medications.     Review of Systems  Anesthesia Hx:  No problems with previous Anesthesia    Social:  Smoker, No Alcohol Use    Hematology/Oncology:  Hematology Normal   Oncology Normal     EENT/Dental:EENT/Dental Normal   Cardiovascular:  Cardiovascular Normal Exercise tolerance: good     Pulmonary:  Pulmonary Normal    Renal/:  Renal/ Normal     Hepatic/GI:  Hepatic/GI Normal    Musculoskeletal:  Musculoskeletal Normal    Neurological:   Neuromuscular Disease,    Endocrine:  Endocrine Normal    Dermatological:  Skin Normal    Psych:   Psychiatric History          Physical Exam  General:  Well nourished    Airway/Jaw/Neck:  Airway Findings: Mouth Opening: Normal Tongue: Normal  General Airway Assessment: Adult  Mallampati: II  TM Distance: Normal, at least 6 cm  Jaw/Neck Findings:  Neck ROM: Normal ROM      Dental:  Dental Findings: In tact        Mental Status:  Mental Status Findings:  Cooperative         Anesthesia Plan  Type of Anesthesia, risks & benefits discussed:  Anesthesia Type:  MAC, general  Patient's Preference:   Intra-op Monitoring Plan: standard ASA monitors  Intra-op Monitoring Plan Comments:   Post Op Pain Control Plan: multimodal analgesia  Post Op Pain Control Plan Comments:   Induction:   IV  Beta Blocker:  Patient is not currently on a Beta-Blocker (No further documentation required).       Informed Consent: Patient understands risks and agrees with Anesthesia plan.  Questions answered. Anesthesia consent signed with patient.  ASA Score: 2     Day of Surgery Review of History & Physical: I have interviewed and examined the patient. I have reviewed the patient's H&P dated: 4/11/18. There are no significant  changes.  H&P update referred to the surgeon.         Ready For Surgery From Anesthesia Perspective.

## 2018-04-09 NOTE — DISCHARGE INSTRUCTIONS
Outpatient procedure instructions    Prep Review  Nothing to eat or drink after midnight unless your doctor tells you differently.    Bring your medication in the original containers.   Take medications as instructed by your doctor.    Wear something comfortable that is easy for you to take off and put on.   Do not wear any makeup, jewelry, or body piercings. Leave valuables at home or let your family member keep them for you. Do not bring them to the Surgery area.     Date/Day of Procedure: Wednesday 4/11/18  Arrival Time: 6am       Report to the Emergency Room if asked to arrive at the hospital before 7:00 a.m.   It is not necessary to report earlier than the time you are told.   Ignore any automated/computer generated calls telling to what time to report to the hospital.   Plan to be at the hospital for about 4 hours, however, it could be longer.

## 2018-04-09 NOTE — PROGRESS NOTES
"Subjective:    Patient ID: Florence Hinkle is a 27 y.o. y.o. female.     Chief Complaint:   Chief Complaint   Patient presents with    Pre-op Exam       History of Present Illness   Florence presents today for preoperative counseling prior to planned LEEP in OR.  LEEP scheduled secondary to ASCUS, cannot exclude HSIL pap in 8/17 and TYLER II on colposcopic biopsy in 12/17.  LEEP initially planned for clinic but patient was too anxious to proceed in clinic setting and also testing revealed trichomonas.  She also reports decreased libido and estradiol level was low.  States she has tried a variety of hormonal contraception in the past.  Patient's last menstrual period was 03/11/2018 (approximate)..     Past Medical History:   Diagnosis Date    Abnormal Pap smear of cervix     Arthritis     Fibromyalgia     Mental disorder     Neuropathic pain     Lower extremities left hip and knee    Stress     Anxiety     Past Surgical History:   Procedure Laterality Date    KNEE SURGERY  2012    left-arthroscopy shave knee cap and debride    MOUTH SURGERY  11/2006    [place] teeth removed    TONSILLECTOMY  01/02/2018     Review of patient's allergies indicates:   Allergen Reactions    Sulfa (sulfonamide antibiotics) Hives, Nausea And Vomiting and Other (See Comments)     Weakness, dizziness, whole body hurt, needed help getting up    Trazodone Other (See Comments)     "makes me crazy"    Phenergan [promethazine] Rash     Current Outpatient Prescriptions on File Prior to Visit   Medication Sig Dispense Refill    cetirizine (ZYRTEC) 10 MG tablet Take 10 mg by mouth every evening.       clonazepam (KLONOPIN) 1 MG tablet Take 1 mg by mouth 3 (three) times daily.       cyclobenzaprine (FLEXERIL) 10 MG tablet TAKE 1 TABLET BY MOUTH 3 TIMES A DAY AS NEEDED FOR MUSCLE SPASM 90 tablet 2    DEXILANT 60 mg capsule Take 60 mg by mouth once daily.       dextroamphetamine-amphetamine (ADDERALL XR) 20 MG 24 hr capsule Take 20 mg by " mouth every morning.       diphenoxylate-atropine 2.5-0.025 mg (LOMOTIL) 2.5-0.025 mg per tablet Take 1 tablet by mouth.       hydrocodone-acetaminophen 7.5-325mg (NORCO) 7.5-325 mg per tablet Take 1 tablet by mouth every 6 (six) hours as needed.       montelukast (SINGULAIR) 10 mg tablet Take 10 mg by mouth every evening.       valacyclovir (VALTREX) 1000 MG tablet Take 1 tablet by mouth as needed.      [DISCONTINUED] L norgest/e.estradiol-e.estrad 0.15 mg-30 mcg (84)/10 mcg (7) 3MPk Take 1 tablet by mouth once daily. 84 each 3    [DISCONTINUED] naloxone 2 mg/0.4 mL AtIn Inject as directed daily as needed.      [DISCONTINUED] nortriptyline (PAMELOR) 10 MG capsule Take 1 capsule (10 mg total) by mouth every evening. 30 capsule 11    [DISCONTINUED] sertraline (ZOLOFT) 100 MG tablet Take 200 mg by mouth once daily.        No current facility-administered medications on file prior to visit.      Social History   Substance Use Topics    Smoking status: Current Every Day Smoker     Packs/day: 0.50     Years: 6.00     Types: Cigarettes     Start date: 1/9/2008    Smokeless tobacco: Never Used    Alcohol use Yes      Comment: Socially-not every week     Family History   Problem Relation Age of Onset    Breast cancer Neg Hx     Colon cancer Neg Hx     Ovarian cancer Neg Hx      The following portions of the patient's history were reviewed and updated as appropriate: allergies, current medications, past family history, past medical history, past social history, past surgical history and problem list.      Review of Systems   All other systems reviewed and are negative.        Objective:    Vital Signs:  Vitals:    04/09/18 0847   BP: 119/72   Pulse: 98   Resp: 16       Physical Exam:  General:  alert; normal appearing, well-nourished female   Skin:  Skin color, texture, turgor normal. No rashes or lesions   CV:  PULM:  Abdomen: Regular rate and rhyth,  Clear to auscultation bilaterally   soft, non-tender. Bowel  sounds normal. No masses,  no organomegaly   Pelvis: Deferred         Assessment:      1. Moderate dysplasia of cervix (TYLER II)    2. Preoperative testing    3. Encounter for initial prescription of contraceptive pills          Plan:      Moderate dysplasia of cervix (TYLER II)  -     metroNIDAZOLE (FLAGYL) 500 MG tablet; Take 1 tablet (500 mg total) by mouth every 12 (twelve) hours.  Dispense: 14 tablet; Refill: 0  -     ibuprofen (ADVIL,MOTRIN) 800 MG tablet; Take 1 tablet (800 mg total) by mouth every 8 (eight) hours as needed for Pain (pain/cramping).  Dispense: 30 tablet; Refill: 0  -     Up ad antoinette; Standing  -     POCT glucose; Standing  -     Notify physician if BS > 180 for hysterectomy patients; Standing  -     Chlorhexidine (CHG) 2% Wipes; Standing  -     Notify Physician/Vital Signs Parameters Urine output less than 0.5mL/kg/hr (with indwelling catheter) or 30 mL/hr (without indwelling catheter) or blood glucose greater than 200 mg/dL; Standing  -     Notify physician; Standing  -     Notify Physician - Potential Need of Opioid Reversal; Standing  -     Place in Outpatient; Standing  -     Diet NPO; Standing  -     Place sequential compression device; Standing    Preoperative testing  -     Type & Screen; Future; Expected date: 04/09/2018  -     hCG, quantitative; Future; Expected date: 04/09/2018  -     Urinalysis; Future; Expected date: 04/09/2018    Encounter for initial prescription of contraceptive pills  -     norgestimate-ethinyl estradiol (SPRINTEC, 28,) 0.25-35 mg-mcg per tablet; Take 1 tablet by mouth once daily. Do not take placebo pills.  Start next pack.  Dispense: 84 tablet; Refill: 3    Other orders  -     IP VTE LOW RISK PATIENT; Standing      Counseled on further evaluation and management options.   Pt desires to proceed with LEEP.  Counseled on risks, benefits, alternatives, and potential side effects.   Pt verbalized understanding.  Consents signed.  Will proceed with LEEP pending  acceptable preoperative testing.

## 2018-04-09 NOTE — H&P
"Subjective:    Patient ID: Florence Hinkle is a 27 y.o. y.o. female.     Chief Complaint:   Chief Complaint   Patient presents with    Pre-op Exam       History of Present Illness   Florence presents today for preoperative counseling prior to planned LEEP in OR.  LEEP scheduled secondary to ASCUS, cannot exclude HSIL pap in 8/17 and TYLER II on colposcopic biopsy in 12/17.  LEEP initially planned for clinic but patient was too anxious to proceed in clinic setting and also testing revealed trichomonas.  She also reports decreased libido and estradiol level was low.  States she has tried a variety of hormonal contraception in the past.  Patient's last menstrual period was 03/11/2018 (approximate)..     Past Medical History:   Diagnosis Date    Abnormal Pap smear of cervix     Arthritis     Fibromyalgia     Mental disorder     Neuropathic pain     Lower extremities left hip and knee    Stress     Anxiety     Past Surgical History:   Procedure Laterality Date    KNEE SURGERY  2012    left-arthroscopy shave knee cap and debride    MOUTH SURGERY  11/2006    [place] teeth removed    TONSILLECTOMY  01/02/2018     Review of patient's allergies indicates:   Allergen Reactions    Sulfa (sulfonamide antibiotics) Hives, Nausea And Vomiting and Other (See Comments)     Weakness, dizziness, whole body hurt, needed help getting up    Trazodone Other (See Comments)     "makes me crazy"    Phenergan [promethazine] Rash     Current Outpatient Prescriptions on File Prior to Visit   Medication Sig Dispense Refill    cetirizine (ZYRTEC) 10 MG tablet Take 10 mg by mouth every evening.       clonazepam (KLONOPIN) 1 MG tablet Take 1 mg by mouth 3 (three) times daily.       cyclobenzaprine (FLEXERIL) 10 MG tablet TAKE 1 TABLET BY MOUTH 3 TIMES A DAY AS NEEDED FOR MUSCLE SPASM 90 tablet 2    DEXILANT 60 mg capsule Take 60 mg by mouth once daily.       dextroamphetamine-amphetamine (ADDERALL XR) 20 MG 24 hr capsule Take 20 mg by " mouth every morning.       diphenoxylate-atropine 2.5-0.025 mg (LOMOTIL) 2.5-0.025 mg per tablet Take 1 tablet by mouth.       hydrocodone-acetaminophen 7.5-325mg (NORCO) 7.5-325 mg per tablet Take 1 tablet by mouth every 6 (six) hours as needed.       montelukast (SINGULAIR) 10 mg tablet Take 10 mg by mouth every evening.       valacyclovir (VALTREX) 1000 MG tablet Take 1 tablet by mouth as needed.      [DISCONTINUED] L norgest/e.estradiol-e.estrad 0.15 mg-30 mcg (84)/10 mcg (7) 3MPk Take 1 tablet by mouth once daily. 84 each 3    [DISCONTINUED] naloxone 2 mg/0.4 mL AtIn Inject as directed daily as needed.      [DISCONTINUED] nortriptyline (PAMELOR) 10 MG capsule Take 1 capsule (10 mg total) by mouth every evening. 30 capsule 11    [DISCONTINUED] sertraline (ZOLOFT) 100 MG tablet Take 200 mg by mouth once daily.        No current facility-administered medications on file prior to visit.      Social History   Substance Use Topics    Smoking status: Current Every Day Smoker     Packs/day: 0.50     Years: 6.00     Types: Cigarettes     Start date: 1/9/2008    Smokeless tobacco: Never Used    Alcohol use Yes      Comment: Socially-not every week     Family History   Problem Relation Age of Onset    Breast cancer Neg Hx     Colon cancer Neg Hx     Ovarian cancer Neg Hx      The following portions of the patient's history were reviewed and updated as appropriate: allergies, current medications, past family history, past medical history, past social history, past surgical history and problem list.      Review of Systems   All other systems reviewed and are negative.        Objective:    Vital Signs:  Vitals:    04/09/18 0847   BP: 119/72   Pulse: 98   Resp: 16       Physical Exam:  General:  alert; normal appearing, well-nourished female   Skin:  Skin color, texture, turgor normal. No rashes or lesions   CV:  PULM:  Abdomen: Regular rate and rhyth,  Clear to auscultation bilaterally   soft, non-tender. Bowel  sounds normal. No masses,  no organomegaly   Pelvis: Deferred         Assessment:      1. Moderate dysplasia of cervix (TYLER II)    2. Preoperative testing    3. Encounter for initial prescription of contraceptive pills          Plan:      Moderate dysplasia of cervix (TYLER II)  -     metroNIDAZOLE (FLAGYL) 500 MG tablet; Take 1 tablet (500 mg total) by mouth every 12 (twelve) hours.  Dispense: 14 tablet; Refill: 0  -     ibuprofen (ADVIL,MOTRIN) 800 MG tablet; Take 1 tablet (800 mg total) by mouth every 8 (eight) hours as needed for Pain (pain/cramping).  Dispense: 30 tablet; Refill: 0  -     Up ad antoinette; Standing  -     POCT glucose; Standing  -     Notify physician if BS > 180 for hysterectomy patients; Standing  -     Chlorhexidine (CHG) 2% Wipes; Standing  -     Notify Physician/Vital Signs Parameters Urine output less than 0.5mL/kg/hr (with indwelling catheter) or 30 mL/hr (without indwelling catheter) or blood glucose greater than 200 mg/dL; Standing  -     Notify physician; Standing  -     Notify Physician - Potential Need of Opioid Reversal; Standing  -     Place in Outpatient; Standing  -     Diet NPO; Standing  -     Place sequential compression device; Standing    Preoperative testing  -     Type & Screen; Future; Expected date: 04/09/2018  -     hCG, quantitative; Future; Expected date: 04/09/2018  -     Urinalysis; Future; Expected date: 04/09/2018    Encounter for initial prescription of contraceptive pills  -     norgestimate-ethinyl estradiol (SPRINTEC, 28,) 0.25-35 mg-mcg per tablet; Take 1 tablet by mouth once daily. Do not take placebo pills.  Start next pack.  Dispense: 84 tablet; Refill: 3    Other orders  -     IP VTE LOW RISK PATIENT; Standing      Counseled on further evaluation and management options.   Pt desires to proceed with LEEP.  Counseled on risks, benefits, alternatives, and potential side effects.   Pt verbalized understanding.  Consents signed.  Will proceed with LEEP pending  acceptable preoperative testing.

## 2018-04-10 ENCOUNTER — TELEPHONE (OUTPATIENT)
Dept: OBSTETRICS AND GYNECOLOGY | Facility: CLINIC | Age: 28
End: 2018-04-10

## 2018-04-10 NOTE — TELEPHONE ENCOUNTER
Patient requesting to know if pre-op testing was negative. Patient informed test were negative per patient portal note from Dr. Sifuentes. Patient verbalized understanding with no questions or concerns.

## 2018-04-10 NOTE — TELEPHONE ENCOUNTER
Please call patient before 1.. She is very upset. She has a procedure maikel. Morning. She has questions.

## 2018-04-11 ENCOUNTER — HOSPITAL ENCOUNTER (OUTPATIENT)
Facility: HOSPITAL | Age: 28
Discharge: HOME | End: 2018-04-11
Attending: OBSTETRICS & GYNECOLOGY | Admitting: OBSTETRICS & GYNECOLOGY
Payer: MEDICAID

## 2018-04-11 ENCOUNTER — ANESTHESIA (OUTPATIENT)
Dept: SURGERY | Facility: HOSPITAL | Age: 28
End: 2018-04-11
Payer: MEDICAID

## 2018-04-11 VITALS
TEMPERATURE: 97 F | RESPIRATION RATE: 18 BRPM | DIASTOLIC BLOOD PRESSURE: 69 MMHG | HEART RATE: 95 BPM | SYSTOLIC BLOOD PRESSURE: 103 MMHG | OXYGEN SATURATION: 98 %

## 2018-04-11 DIAGNOSIS — N87.1 MODERATE DYSPLASIA OF CERVIX (CIN II): ICD-10-CM

## 2018-04-11 PROBLEM — R87.610 PAP SMEAR CANNOT EXCLUDE HIGH GRADE SQUAMOUS INTRAEPITHELIAL LESION (ASC-H): Status: ACTIVE | Noted: 2018-04-11

## 2018-04-11 PROCEDURE — 00940 ANES VAGINAL PX NOS: CPT | Performed by: OBSTETRICS & GYNECOLOGY

## 2018-04-11 PROCEDURE — 88305 TISSUE EXAM BY PATHOLOGIST: CPT | Performed by: PATHOLOGY

## 2018-04-11 PROCEDURE — 36000706: Performed by: OBSTETRICS & GYNECOLOGY

## 2018-04-11 PROCEDURE — 57522 CONIZATION OF CERVIX: CPT | Mod: ,,, | Performed by: OBSTETRICS & GYNECOLOGY

## 2018-04-11 PROCEDURE — 36000707: Performed by: OBSTETRICS & GYNECOLOGY

## 2018-04-11 PROCEDURE — 00940 ANES VAGINAL PX NOS: CPT | Mod: QZ,P2 | Performed by: NURSE ANESTHETIST, CERTIFIED REGISTERED

## 2018-04-11 PROCEDURE — 37000008 HC ANESTHESIA 1ST 15 MINUTES: Performed by: OBSTETRICS & GYNECOLOGY

## 2018-04-11 PROCEDURE — 88305 TISSUE EXAM BY PATHOLOGIST: CPT | Mod: 26,,, | Performed by: PATHOLOGY

## 2018-04-11 PROCEDURE — 37000009 HC ANESTHESIA EA ADD 15 MINS: Performed by: OBSTETRICS & GYNECOLOGY

## 2018-04-11 PROCEDURE — 25000003 PHARM REV CODE 250: Performed by: NURSE ANESTHETIST, CERTIFIED REGISTERED

## 2018-04-11 PROCEDURE — 71000033 HC RECOVERY, INTIAL HOUR: Performed by: OBSTETRICS & GYNECOLOGY

## 2018-04-11 PROCEDURE — 88307 TISSUE EXAM BY PATHOLOGIST: CPT | Mod: 26,,, | Performed by: PATHOLOGY

## 2018-04-11 PROCEDURE — 63600175 PHARM REV CODE 636 W HCPCS: Performed by: NURSE ANESTHETIST, CERTIFIED REGISTERED

## 2018-04-11 RX ORDER — GLYCOPYRROLATE 0.2 MG/ML
INJECTION INTRAMUSCULAR; INTRAVENOUS
Status: DISCONTINUED | OUTPATIENT
Start: 2018-04-11 | End: 2018-04-11

## 2018-04-11 RX ORDER — HYDROCODONE BITARTRATE AND ACETAMINOPHEN 5; 325 MG/1; MG/1
1 TABLET ORAL EVERY 4 HOURS PRN
Status: DISCONTINUED | OUTPATIENT
Start: 2018-04-11 | End: 2018-04-11 | Stop reason: HOSPADM

## 2018-04-11 RX ORDER — DEXAMETHASONE SODIUM PHOSPHATE 4 MG/ML
INJECTION, SOLUTION INTRA-ARTICULAR; INTRALESIONAL; INTRAMUSCULAR; INTRAVENOUS; SOFT TISSUE
Status: DISCONTINUED | OUTPATIENT
Start: 2018-04-11 | End: 2018-04-11

## 2018-04-11 RX ORDER — PROPOFOL 10 MG/ML
INJECTION, EMULSION INTRAVENOUS
Status: DISCONTINUED | OUTPATIENT
Start: 2018-04-11 | End: 2018-04-11

## 2018-04-11 RX ORDER — ONDANSETRON 2 MG/ML
4 INJECTION INTRAMUSCULAR; INTRAVENOUS EVERY 6 HOURS PRN
Status: DISCONTINUED | OUTPATIENT
Start: 2018-04-11 | End: 2018-04-11 | Stop reason: HOSPADM

## 2018-04-11 RX ORDER — FENTANYL CITRATE 50 UG/ML
INJECTION, SOLUTION INTRAMUSCULAR; INTRAVENOUS
Status: DISCONTINUED | OUTPATIENT
Start: 2018-04-11 | End: 2018-04-11

## 2018-04-11 RX ORDER — KETAMINE HYDROCHLORIDE 100 MG/ML
INJECTION, SOLUTION INTRAMUSCULAR; INTRAVENOUS
Status: DISCONTINUED | OUTPATIENT
Start: 2018-04-11 | End: 2018-04-11

## 2018-04-11 RX ORDER — MIDAZOLAM HYDROCHLORIDE 1 MG/ML
INJECTION, SOLUTION INTRAMUSCULAR; INTRAVENOUS
Status: DISCONTINUED | OUTPATIENT
Start: 2018-04-11 | End: 2018-04-11

## 2018-04-11 RX ORDER — KETOROLAC TROMETHAMINE 30 MG/ML
INJECTION, SOLUTION INTRAMUSCULAR; INTRAVENOUS
Status: DISCONTINUED | OUTPATIENT
Start: 2018-04-11 | End: 2018-04-11

## 2018-04-11 RX ORDER — DIPHENHYDRAMINE HCL 25 MG
25 CAPSULE ORAL EVERY 4 HOURS PRN
Status: DISCONTINUED | OUTPATIENT
Start: 2018-04-11 | End: 2018-04-11 | Stop reason: HOSPADM

## 2018-04-11 RX ORDER — HYDROCODONE BITARTRATE AND ACETAMINOPHEN 10; 325 MG/1; MG/1
1 TABLET ORAL EVERY 4 HOURS PRN
Status: DISCONTINUED | OUTPATIENT
Start: 2018-04-11 | End: 2018-04-11 | Stop reason: HOSPADM

## 2018-04-11 RX ORDER — MEPERIDINE HYDROCHLORIDE 50 MG/ML
50 INJECTION INTRAMUSCULAR; INTRAVENOUS; SUBCUTANEOUS EVERY 4 HOURS PRN
Status: DISCONTINUED | OUTPATIENT
Start: 2018-04-11 | End: 2018-04-11 | Stop reason: HOSPADM

## 2018-04-11 RX ORDER — LIDOCAINE HCL/PF 100 MG/5ML
SYRINGE (ML) INTRAVENOUS
Status: DISCONTINUED | OUTPATIENT
Start: 2018-04-11 | End: 2018-04-11

## 2018-04-11 RX ORDER — AMOXICILLIN 250 MG
1 CAPSULE ORAL 2 TIMES DAILY
Status: DISCONTINUED | OUTPATIENT
Start: 2018-04-11 | End: 2018-04-11 | Stop reason: HOSPADM

## 2018-04-11 RX ORDER — SODIUM CHLORIDE, SODIUM LACTATE, POTASSIUM CHLORIDE, CALCIUM CHLORIDE 600; 310; 30; 20 MG/100ML; MG/100ML; MG/100ML; MG/100ML
INJECTION, SOLUTION INTRAVENOUS CONTINUOUS
Status: DISCONTINUED | OUTPATIENT
Start: 2018-04-11 | End: 2018-04-11 | Stop reason: HOSPADM

## 2018-04-11 RX ORDER — SODIUM CHLORIDE, SODIUM LACTATE, POTASSIUM CHLORIDE, CALCIUM CHLORIDE 600; 310; 30; 20 MG/100ML; MG/100ML; MG/100ML; MG/100ML
INJECTION, SOLUTION INTRAVENOUS CONTINUOUS PRN
Status: DISCONTINUED | OUTPATIENT
Start: 2018-04-11 | End: 2018-04-11

## 2018-04-11 RX ORDER — ONDANSETRON 2 MG/ML
INJECTION INTRAMUSCULAR; INTRAVENOUS
Status: DISCONTINUED | OUTPATIENT
Start: 2018-04-11 | End: 2018-04-11

## 2018-04-11 RX ADMIN — PROPOFOL 50 MG: 10 INJECTION, EMULSION INTRAVENOUS at 08:04

## 2018-04-11 RX ADMIN — FENTANYL CITRATE 50 MCG: 50 INJECTION, SOLUTION INTRAMUSCULAR; INTRAVENOUS at 08:04

## 2018-04-11 RX ADMIN — SODIUM CHLORIDE, SODIUM LACTATE, POTASSIUM CHLORIDE, AND CALCIUM CHLORIDE: .6; .31; .03; .02 INJECTION, SOLUTION INTRAVENOUS at 08:04

## 2018-04-11 RX ADMIN — PROPOFOL 80 MG: 10 INJECTION, EMULSION INTRAVENOUS at 08:04

## 2018-04-11 RX ADMIN — KETAMINE HYDROCHLORIDE 20 MG: 100 INJECTION, SOLUTION, CONCENTRATE INTRAMUSCULAR; INTRAVENOUS at 09:04

## 2018-04-11 RX ADMIN — PROPOFOL 50 MG: 10 INJECTION, EMULSION INTRAVENOUS at 09:04

## 2018-04-11 RX ADMIN — ONDANSETRON 4 MG: 2 INJECTION, SOLUTION INTRAMUSCULAR; INTRAVENOUS at 08:04

## 2018-04-11 RX ADMIN — KETAMINE HYDROCHLORIDE 30 MG: 100 INJECTION, SOLUTION, CONCENTRATE INTRAMUSCULAR; INTRAVENOUS at 08:04

## 2018-04-11 RX ADMIN — GLYCOPYRROLATE 0.2 MG: 0.2 INJECTION INTRAMUSCULAR; INTRAVENOUS at 09:04

## 2018-04-11 RX ADMIN — LIDOCAINE HYDROCHLORIDE 60 MG: 20 INJECTION, SOLUTION INTRAVENOUS at 08:04

## 2018-04-11 RX ADMIN — MIDAZOLAM 4 MG: 1 INJECTION INTRAMUSCULAR; INTRAVENOUS at 08:04

## 2018-04-11 RX ADMIN — KETOROLAC TROMETHAMINE 30 MG: 30 INJECTION, SOLUTION INTRAMUSCULAR; INTRAVENOUS at 09:04

## 2018-04-11 RX ADMIN — DEXAMETHASONE SODIUM PHOSPHATE 8 MG: 4 INJECTION, SOLUTION INTRAMUSCULAR; INTRAVENOUS at 08:04

## 2018-04-11 NOTE — OP NOTE
DATE OF PROCEDURE:  04/11/2018    OPERATION:  LEEP procedure.    PREOPERATIVE DIAGNOSES:  1.  Moderate cervical dysplasia of the cervix.  2.  ASC-H Pap.    POSTOPERATIVE DIAGNOSES:  1.  Moderate cervical dysplasia of the cervix.  2.  ASC-H Pap.    SURGEON:  Esperanza Sifuentes M.D.    ANESTHESIA:  Local/MAC.    COMPLICATIONS:  None.    INDICATION:  Ms. Florence Hinkle is a 27-year-old female with an ASC-H Pap (ASCUS   Pap, cannot rule out high-grade lesion) and moderate cervical dysplasia noted on   colposcopic biopsies.  The patient is requesting LEEP to be done in the OR   secondary to anxiety and pain.    FINDINGS:  There was decreased uptake of Lugol's solution noted at 12 o'clock   and 8 o'clock.    ESTIMATED BLOOD LOSS:  Less than 5 mL.    URINE OUTPUT:  Approximately 50 mL of clear urine drained at the beginning of   the procedure.    PROCEDURE IN DETAIL:  The patient was taken to the Operating Room where   anesthesia was administered (MAC).  She was placed in the dorsal lithotomy   position in adjustable Yellofin stirrups.  A red rubber catheter was used to   drain the patient's bladder under sterile conditions.  Coated speculum was   placed inside the patient's vagina.  Lugol solution was applied to the cervix   and vagina.  Then, 1% lidocaine was injected as a local cervical and   paracervical block.  Loop was selected.  Decreased uptake of Lugol's was noted   at 12 o'clock and 8 o'clock.  LEEP was then performed, in which the areas of   decreased uptake as well as the transformation zone were excised in entirety.    Excellent hemostasis was noted.  Post-LEEP, ECC was performed and sent to   Pathology.  Rollerball was used to coagulate the base of the cervix.  Excellent   hemostasis was noted.  Monsel's was applied.  All instruments were then removed   from the patient's vagina.  Sponge, lap, needle, and instrument counts were   correct x2 and the patient was taken to the Recovery Room, awake, and in sterile    condition.      ELM/GEOVANNI  dd: 04/11/2018 18:12:28 (CDT)  td: 04/11/2018 18:54:11 (CDT)  Doc ID   #[id number]  Job ID #[id number]    CC:

## 2018-04-11 NOTE — H&P (VIEW-ONLY)
"Subjective:    Patient ID: Florence Hinkle is a 27 y.o. y.o. female.     Chief Complaint:   Chief Complaint   Patient presents with    Pre-op Exam       History of Present Illness   Florence presents today for preoperative counseling prior to planned LEEP in OR.  LEEP scheduled secondary to ASCUS, cannot exclude HSIL pap in 8/17 and TYLER II on colposcopic biopsy in 12/17.  LEEP initially planned for clinic but patient was too anxious to proceed in clinic setting and also testing revealed trichomonas.  She also reports decreased libido and estradiol level was low.  States she has tried a variety of hormonal contraception in the past.  Patient's last menstrual period was 03/11/2018 (approximate)..     Past Medical History:   Diagnosis Date    Abnormal Pap smear of cervix     Arthritis     Fibromyalgia     Mental disorder     Neuropathic pain     Lower extremities left hip and knee    Stress     Anxiety     Past Surgical History:   Procedure Laterality Date    KNEE SURGERY  2012    left-arthroscopy shave knee cap and debride    MOUTH SURGERY  11/2006    [place] teeth removed    TONSILLECTOMY  01/02/2018     Review of patient's allergies indicates:   Allergen Reactions    Sulfa (sulfonamide antibiotics) Hives, Nausea And Vomiting and Other (See Comments)     Weakness, dizziness, whole body hurt, needed help getting up    Trazodone Other (See Comments)     "makes me crazy"    Phenergan [promethazine] Rash     Current Outpatient Prescriptions on File Prior to Visit   Medication Sig Dispense Refill    cetirizine (ZYRTEC) 10 MG tablet Take 10 mg by mouth every evening.       clonazepam (KLONOPIN) 1 MG tablet Take 1 mg by mouth 3 (three) times daily.       cyclobenzaprine (FLEXERIL) 10 MG tablet TAKE 1 TABLET BY MOUTH 3 TIMES A DAY AS NEEDED FOR MUSCLE SPASM 90 tablet 2    DEXILANT 60 mg capsule Take 60 mg by mouth once daily.       dextroamphetamine-amphetamine (ADDERALL XR) 20 MG 24 hr capsule Take 20 mg by " mouth every morning.       diphenoxylate-atropine 2.5-0.025 mg (LOMOTIL) 2.5-0.025 mg per tablet Take 1 tablet by mouth.       hydrocodone-acetaminophen 7.5-325mg (NORCO) 7.5-325 mg per tablet Take 1 tablet by mouth every 6 (six) hours as needed.       montelukast (SINGULAIR) 10 mg tablet Take 10 mg by mouth every evening.       valacyclovir (VALTREX) 1000 MG tablet Take 1 tablet by mouth as needed.      [DISCONTINUED] L norgest/e.estradiol-e.estrad 0.15 mg-30 mcg (84)/10 mcg (7) 3MPk Take 1 tablet by mouth once daily. 84 each 3    [DISCONTINUED] naloxone 2 mg/0.4 mL AtIn Inject as directed daily as needed.      [DISCONTINUED] nortriptyline (PAMELOR) 10 MG capsule Take 1 capsule (10 mg total) by mouth every evening. 30 capsule 11    [DISCONTINUED] sertraline (ZOLOFT) 100 MG tablet Take 200 mg by mouth once daily.        No current facility-administered medications on file prior to visit.      Social History   Substance Use Topics    Smoking status: Current Every Day Smoker     Packs/day: 0.50     Years: 6.00     Types: Cigarettes     Start date: 1/9/2008    Smokeless tobacco: Never Used    Alcohol use Yes      Comment: Socially-not every week     Family History   Problem Relation Age of Onset    Breast cancer Neg Hx     Colon cancer Neg Hx     Ovarian cancer Neg Hx      The following portions of the patient's history were reviewed and updated as appropriate: allergies, current medications, past family history, past medical history, past social history, past surgical history and problem list.      Review of Systems   All other systems reviewed and are negative.        Objective:    Vital Signs:  Vitals:    04/09/18 0847   BP: 119/72   Pulse: 98   Resp: 16       Physical Exam:  General:  alert; normal appearing, well-nourished female   Skin:  Skin color, texture, turgor normal. No rashes or lesions   CV:  PULM:  Abdomen: Regular rate and rhyth,  Clear to auscultation bilaterally   soft, non-tender. Bowel  sounds normal. No masses,  no organomegaly   Pelvis: Deferred         Assessment:      1. Moderate dysplasia of cervix (TYLER II)    2. Preoperative testing    3. Encounter for initial prescription of contraceptive pills          Plan:      Moderate dysplasia of cervix (TYLER II)  -     metroNIDAZOLE (FLAGYL) 500 MG tablet; Take 1 tablet (500 mg total) by mouth every 12 (twelve) hours.  Dispense: 14 tablet; Refill: 0  -     ibuprofen (ADVIL,MOTRIN) 800 MG tablet; Take 1 tablet (800 mg total) by mouth every 8 (eight) hours as needed for Pain (pain/cramping).  Dispense: 30 tablet; Refill: 0  -     Up ad antoinette; Standing  -     POCT glucose; Standing  -     Notify physician if BS > 180 for hysterectomy patients; Standing  -     Chlorhexidine (CHG) 2% Wipes; Standing  -     Notify Physician/Vital Signs Parameters Urine output less than 0.5mL/kg/hr (with indwelling catheter) or 30 mL/hr (without indwelling catheter) or blood glucose greater than 200 mg/dL; Standing  -     Notify physician; Standing  -     Notify Physician - Potential Need of Opioid Reversal; Standing  -     Place in Outpatient; Standing  -     Diet NPO; Standing  -     Place sequential compression device; Standing    Preoperative testing  -     Type & Screen; Future; Expected date: 04/09/2018  -     hCG, quantitative; Future; Expected date: 04/09/2018  -     Urinalysis; Future; Expected date: 04/09/2018    Encounter for initial prescription of contraceptive pills  -     norgestimate-ethinyl estradiol (SPRINTEC, 28,) 0.25-35 mg-mcg per tablet; Take 1 tablet by mouth once daily. Do not take placebo pills.  Start next pack.  Dispense: 84 tablet; Refill: 3    Other orders  -     IP VTE LOW RISK PATIENT; Standing      Counseled on further evaluation and management options.   Pt desires to proceed with LEEP.  Counseled on risks, benefits, alternatives, and potential side effects.   Pt verbalized understanding.  Consents signed.  Will proceed with LEEP pending  acceptable preoperative testing.

## 2018-04-11 NOTE — BRIEF OP NOTE
Operative Note     SUMMARY     Surgery Date: 4/11/2018     Surgeon(s) and Role:     * Esperanza Sifuentes MD - Primary    Pre-op Diagnosis: Moderate dysplasia of cervix (TYLER III) [D06.9]  ASC-H pap    Post-op Diagnosis:  same    Procedure(s) (LRB):  LEEP PROCEDURE (N/A)    Anesthesia: Local MAC    Findings/Key Components:  Decreased uptake of Lugol's noted at ~ 12:00 and 8:00.    Estimated Blood Loss: < 5 mL         Specimens     Start     Ordered    04/11/18 0900  Specimen to Pathology - Surgery  Once      04/11/18 0919        Discharge Note      SUMMARY     Admit Date: 4/11/2018    Attending Physician: Esperanza Sifuentes MD     Discharge Physician: Esperanza Sifuentes MD    Discharge Date: 4/11/2018     Admit Diagnosis:  Moderate dysplasia of cervix (TYLER III) [D06.9]  ASC-H pap    Final Diagnosis:     Procedure:  Moderate dysplasia of cervix (TYLER III) [D06.9]  ASC-H pap    Disposition: Home or Self Care    Condition: Stable    Hospital Course: Pt presented for scheduled LEEP which she underwent without complication.  Her postoperative course was uncomplicated and she met all discharge criteria on postop day #0.    Patient Instructions:   Current Discharge Medication List      CONTINUE these medications which have NOT CHANGED    Details   cetirizine (ZYRTEC) 10 MG tablet Take 10 mg by mouth every evening.       clonazepam (KLONOPIN) 1 MG tablet Take 1 mg by mouth 3 (three) times daily.       cyclobenzaprine (FLEXERIL) 10 MG tablet TAKE 1 TABLET BY MOUTH 3 TIMES A DAY AS NEEDED FOR MUSCLE SPASM  Qty: 90 tablet, Refills: 2      DEXILANT 60 mg capsule Take 60 mg by mouth once daily.       dextroamphetamine-amphetamine (ADDERALL XR) 20 MG 24 hr capsule Take 20 mg by mouth every morning.       diphenoxylate-atropine 2.5-0.025 mg (LOMOTIL) 2.5-0.025 mg per tablet Take 1 tablet by mouth.       hydrocodone-acetaminophen 7.5-325mg (NORCO) 7.5-325 mg per tablet Take 1 tablet by mouth every 6 (six) hours as needed.       ibuprofen  (ADVIL,MOTRIN) 800 MG tablet Take 1 tablet (800 mg total) by mouth every 8 (eight) hours as needed for Pain (pain/cramping).  Qty: 30 tablet, Refills: 0    Associated Diagnoses: Moderate dysplasia of cervix (TYLER II)      metroNIDAZOLE (FLAGYL) 500 MG tablet Take 1 tablet (500 mg total) by mouth every 12 (twelve) hours.  Qty: 14 tablet, Refills: 0    Associated Diagnoses: Moderate dysplasia of cervix (TYLER II)      montelukast (SINGULAIR) 10 mg tablet Take 10 mg by mouth every evening.       norgestimate-ethinyl estradiol (SPRINTEC, 28,) 0.25-35 mg-mcg per tablet Take 1 tablet by mouth once daily. Do not take placebo pills.  Start next pack.  Qty: 84 tablet, Refills: 3    Associated Diagnoses: Encounter for initial prescription of contraceptive pills      valacyclovir (VALTREX) 1000 MG tablet Take 1 tablet by mouth as needed.             Discharge Procedure Orders (must include Diet, Follow-up, Activity)    Discharge Procedure Orders (must include Diet, Follow-up, Activity)  Diet general     Other restrictions (specify):   Order Comments: No lifting >20lbs, pelvic rest, no driving while on narcotics     Call MD for:  temperature >100.4     Call MD for:  persistent nausea and vomiting     Call MD for:  severe uncontrolled pain     Call MD for:  difficulty breathing, headache or visual disturbances     Call MD for:  redness, tenderness, or signs of infection (pain, swelling, redness, odor or green/yellow discharge around incision site)     Call MD for:  hives     Call MD for:  persistent dizziness or light-headedness     Call MD for:  extreme fatigue     Call MD for:     Wound care routine (specify)   Order Comments: Wound care routine:   1. Keep clean and dry  2. Showers only  3. No creams/ointment  4. Remove soiled sterisrips          Follow-up Information     Esperanza Sifuentes MD In 4 weeks.    Specialty:  Obstetrics  Why:  postop check  Contact information:  [address] [place] [id number] [phone]

## 2018-04-11 NOTE — TRANSFER OF CARE
Anesthesia Transfer of Care Note    Patient: Florence Hinkle    Procedure(s) Performed: Procedure(s) (LRB):  LEEP PROCEDURE (N/A)    Patient location: PACU    Anesthesia Type: general    Transport from OR: Transported from OR on 6-10 L/min O2 by face mask with adequate spontaneous ventilation    Post pain: adequate analgesia    Post assessment: no apparent anesthetic complications and tolerated procedure well    Post vital signs: stable    Level of consciousness: sedated    Nausea/Vomiting: no nausea/vomiting    Complications: none    Transfer of care protocol was followed      Last vitals:   Visit Vitals  /69 (BP Location: Right arm, Patient Position: Lying)   Pulse 95   Temp 36.1 °C (97 °F)   Resp 18   LMP 03/13/2018   SpO2 98%   Breastfeeding? No

## 2018-04-11 NOTE — INTERVAL H&P NOTE
Past Medical History:  Past Medical History:   Diagnosis Date    Abnormal Pap smear of cervix     Arthritis     Fibromyalgia     Mental disorder     Neuropathic pain     Lower extremities left hip and knee    Stress     Anxiety       Past Surgical History:   Procedure Laterality Date    KNEE SURGERY  2012    left-arthroscopy shave knee cap and debride    MOUTH SURGERY  11/2006    [place] teeth removed    TONSILLECTOMY  01/02/2018       Social History   Substance Use Topics    Smoking status: Current Every Day Smoker     Packs/day: 0.50     Years: 6.00     Types: Cigarettes     Start date: 1/9/2008    Smokeless tobacco: Never Used    Alcohol use Yes      Comment: Socially-not every week       Family History   Problem Relation Age of Onset    Breast cancer Neg Hx     Colon cancer Neg Hx     Ovarian cancer Neg Hx        Outpatient Prescriptions Marked as Taking for the 4/11/18 encounter (Hospital Encounter)   Medication Sig Dispense Refill    cetirizine (ZYRTEC) 10 MG tablet Take 10 mg by mouth every evening.       clonazepam (KLONOPIN) 1 MG tablet Take 1 mg by mouth 3 (three) times daily.       cyclobenzaprine (FLEXERIL) 10 MG tablet TAKE 1 TABLET BY MOUTH 3 TIMES A DAY AS NEEDED FOR MUSCLE SPASM 90 tablet 2    DEXILANT 60 mg capsule Take 60 mg by mouth once daily.       dextroamphetamine-amphetamine (ADDERALL XR) 20 MG 24 hr capsule Take 20 mg by mouth every morning.       diphenoxylate-atropine 2.5-0.025 mg (LOMOTIL) 2.5-0.025 mg per tablet Take 1 tablet by mouth.       hydrocodone-acetaminophen 7.5-325mg (NORCO) 7.5-325 mg per tablet Take 1 tablet by mouth every 6 (six) hours as needed.       ibuprofen (ADVIL,MOTRIN) 800 MG tablet Take 1 tablet (800 mg total) by mouth every 8 (eight) hours as needed for Pain (pain/cramping). 30 tablet 0    metroNIDAZOLE (FLAGYL) 500 MG tablet Take 1 tablet (500 mg total) by mouth every 12 (twelve) hours. 14 tablet 0    montelukast (SINGULAIR) 10 mg tablet  "Take 10 mg by mouth every evening.       norgestimate-ethinyl estradiol (SPRINTEC, 28,) 0.25-35 mg-mcg per tablet Take 1 tablet by mouth once daily. Do not take placebo pills.  Start next pack. 84 tablet 3    valacyclovir (VALTREX) 1000 MG tablet Take 1 tablet by mouth as needed.         Review of patient's allergies indicates:   Allergen Reactions    Sulfa (sulfonamide antibiotics) Hives, Nausea And Vomiting and Other (See Comments)     Weakness, dizziness, whole body hurt, needed help getting up    Trazodone Other (See Comments)     "makes me crazy"    Phenergan [promethazine] Rash       The patient has been examined and the H&P has been reviewed:  I concur with the findings and no changes have occurred since H&P was written.    Patient cleared for Anesthesia: General/MAC    Anesthesia/Surgery risks, benefits and alternative options discussed and understood by patient/family.    Active Hospital Problems    Diagnosis  POA    Moderate dysplasia of cervix (TYLER II) [N87.1]  Yes      Resolved Hospital Problems    Diagnosis Date Resolved POA   No resolved problems to display.               "

## 2018-04-11 NOTE — DISCHARGE INSTRUCTIONS
Instructions given per Dr Sifuentes  Easy day  Watch for bleeding,  Notify Dr Sifuentes for large amount of bleeding  No driving, smoking, and drinking alcohol  For 8-10 hours

## 2018-04-11 NOTE — ANESTHESIA POSTPROCEDURE EVALUATION
Anesthesia Post Evaluation    Patient: Florence Hinkle    Procedure(s) Performed: Procedure(s) (LRB):  LEEP PROCEDURE (N/A)    Final Anesthesia Type: general  Patient location during evaluation: PACU  Patient participation: Yes- Able to Participate  Level of consciousness: awake and alert, oriented and awake  Post-procedure vital signs: reviewed and stable  Pain management: adequate  Airway patency: patent  PONV status at discharge: No PONV  Anesthetic complications: no      Cardiovascular status: blood pressure returned to baseline, hemodynamically stable and stable  Respiratory status: unassisted, spontaneous ventilation and room air  Hydration status: euvolemic  Follow-up not needed.        Visit Vitals  /69 (BP Location: Right arm, Patient Position: Lying)   Pulse 95   Temp 36.1 °C (97 °F)   Resp 18   LMP 03/13/2018   SpO2 98%   Breastfeeding? No       Pain/Adeline Score: Pain Assessment Performed: Yes (4/11/2018  9:55 AM)  Presence of Pain: denies (4/11/2018  9:55 AM)  Adeline Score: 10 (4/11/2018  9:55 AM)

## 2021-05-19 ENCOUNTER — LAB VISIT (OUTPATIENT)
Dept: LAB | Facility: HOSPITAL | Age: 31
End: 2021-05-19
Attending: STUDENT IN AN ORGANIZED HEALTH CARE EDUCATION/TRAINING PROGRAM
Payer: COMMERCIAL

## 2021-05-19 ENCOUNTER — OFFICE VISIT (OUTPATIENT)
Dept: OBSTETRICS AND GYNECOLOGY | Facility: CLINIC | Age: 31
End: 2021-05-19
Payer: COMMERCIAL

## 2021-05-19 VITALS
BODY MASS INDEX: 31.04 KG/M2 | SYSTOLIC BLOOD PRESSURE: 118 MMHG | HEART RATE: 78 BPM | DIASTOLIC BLOOD PRESSURE: 82 MMHG | HEIGHT: 63 IN | WEIGHT: 175.19 LBS | RESPIRATION RATE: 12 BRPM | OXYGEN SATURATION: 100 %

## 2021-05-19 DIAGNOSIS — Z01.419 ENCNTR FOR GYN EXAM (GENERAL) (ROUTINE) W/O ABN FINDINGS: ICD-10-CM

## 2021-05-19 DIAGNOSIS — Z12.4 CERVICAL CANCER SCREENING: ICD-10-CM

## 2021-05-19 DIAGNOSIS — Z30.014 ENCOUNTER FOR INITIAL PRESCRIPTION OF INTRAUTERINE CONTRACEPTIVE DEVICE (IUD): ICD-10-CM

## 2021-05-19 DIAGNOSIS — Z01.419 ENCNTR FOR GYN EXAM (GENERAL) (ROUTINE) W/O ABN FINDINGS: Primary | ICD-10-CM

## 2021-05-19 PROCEDURE — 3008F PR BODY MASS INDEX (BMI) DOCUMENTED: ICD-10-PCS | Mod: CPTII,S$GLB,, | Performed by: STUDENT IN AN ORGANIZED HEALTH CARE EDUCATION/TRAINING PROGRAM

## 2021-05-19 PROCEDURE — 88175 CYTOPATH C/V AUTO FLUID REDO: CPT | Performed by: PATHOLOGY

## 2021-05-19 PROCEDURE — 99999 PR PBB SHADOW E&M-NEW PATIENT-LVL III: CPT | Mod: PBBFAC,,, | Performed by: STUDENT IN AN ORGANIZED HEALTH CARE EDUCATION/TRAINING PROGRAM

## 2021-05-19 PROCEDURE — 99385 PR PREVENTIVE VISIT,NEW,18-39: ICD-10-PCS | Mod: S$GLB,,, | Performed by: STUDENT IN AN ORGANIZED HEALTH CARE EDUCATION/TRAINING PROGRAM

## 2021-05-19 PROCEDURE — 1126F PR PAIN SEVERITY QUANTIFIED, NO PAIN PRESENT: ICD-10-PCS | Mod: S$GLB,,, | Performed by: STUDENT IN AN ORGANIZED HEALTH CARE EDUCATION/TRAINING PROGRAM

## 2021-05-19 PROCEDURE — 3008F BODY MASS INDEX DOCD: CPT | Mod: CPTII,S$GLB,, | Performed by: STUDENT IN AN ORGANIZED HEALTH CARE EDUCATION/TRAINING PROGRAM

## 2021-05-19 PROCEDURE — 87340 HEPATITIS B SURFACE AG IA: CPT | Performed by: STUDENT IN AN ORGANIZED HEALTH CARE EDUCATION/TRAINING PROGRAM

## 2021-05-19 PROCEDURE — 87491 CHLMYD TRACH DNA AMP PROBE: CPT | Mod: 59 | Performed by: STUDENT IN AN ORGANIZED HEALTH CARE EDUCATION/TRAINING PROGRAM

## 2021-05-19 PROCEDURE — 87481 CANDIDA DNA AMP PROBE: CPT | Mod: 59 | Performed by: STUDENT IN AN ORGANIZED HEALTH CARE EDUCATION/TRAINING PROGRAM

## 2021-05-19 PROCEDURE — 36415 COLL VENOUS BLD VENIPUNCTURE: CPT | Performed by: STUDENT IN AN ORGANIZED HEALTH CARE EDUCATION/TRAINING PROGRAM

## 2021-05-19 PROCEDURE — 99385 PREV VISIT NEW AGE 18-39: CPT | Mod: S$GLB,,, | Performed by: STUDENT IN AN ORGANIZED HEALTH CARE EDUCATION/TRAINING PROGRAM

## 2021-05-19 PROCEDURE — 99999 PR PBB SHADOW E&M-NEW PATIENT-LVL III: ICD-10-PCS | Mod: PBBFAC,,, | Performed by: STUDENT IN AN ORGANIZED HEALTH CARE EDUCATION/TRAINING PROGRAM

## 2021-05-19 PROCEDURE — 88141 CYTOPATH C/V INTERPRET: CPT | Mod: ,,, | Performed by: PATHOLOGY

## 2021-05-19 PROCEDURE — 86592 SYPHILIS TEST NON-TREP QUAL: CPT | Performed by: STUDENT IN AN ORGANIZED HEALTH CARE EDUCATION/TRAINING PROGRAM

## 2021-05-19 PROCEDURE — 87591 N.GONORRHOEAE DNA AMP PROB: CPT | Performed by: STUDENT IN AN ORGANIZED HEALTH CARE EDUCATION/TRAINING PROGRAM

## 2021-05-19 PROCEDURE — 1126F AMNT PAIN NOTED NONE PRSNT: CPT | Mod: S$GLB,,, | Performed by: STUDENT IN AN ORGANIZED HEALTH CARE EDUCATION/TRAINING PROGRAM

## 2021-05-19 PROCEDURE — 86803 HEPATITIS C AB TEST: CPT | Performed by: STUDENT IN AN ORGANIZED HEALTH CARE EDUCATION/TRAINING PROGRAM

## 2021-05-19 PROCEDURE — 86703 HIV-1/HIV-2 1 RESULT ANTBDY: CPT | Performed by: STUDENT IN AN ORGANIZED HEALTH CARE EDUCATION/TRAINING PROGRAM

## 2021-05-19 PROCEDURE — 87624 HPV HI-RISK TYP POOLED RSLT: CPT | Performed by: STUDENT IN AN ORGANIZED HEALTH CARE EDUCATION/TRAINING PROGRAM

## 2021-05-19 PROCEDURE — 88141 PR  CYTOPATH CERV/VAG INTERPRET: ICD-10-PCS | Mod: ,,, | Performed by: PATHOLOGY

## 2021-05-19 RX ORDER — METHOCARBAMOL 500 MG/1
750 TABLET, FILM COATED ORAL 4 TIMES DAILY
COMMUNITY

## 2021-05-19 RX ORDER — DULOXETIN HYDROCHLORIDE 30 MG/1
CAPSULE, DELAYED RELEASE ORAL
COMMUNITY

## 2021-05-20 LAB
BACTERIAL VAGINOSIS DNA: NEGATIVE
C TRACH DNA SPEC QL NAA+PROBE: NOT DETECTED
CANDIDA GLABRATA DNA: NEGATIVE
CANDIDA KRUSEI DNA: NEGATIVE
CANDIDA RRNA VAG QL PROBE: NEGATIVE
HBV SURFACE AG SERPL QL IA: NEGATIVE
HCV AB SERPL QL IA: NEGATIVE
HIV 1+2 AB+HIV1 P24 AG SERPL QL IA: NEGATIVE
N GONORRHOEA DNA SPEC QL NAA+PROBE: NOT DETECTED
T VAGINALIS RRNA GENITAL QL PROBE: NEGATIVE

## 2021-05-21 LAB — RPR SER QL: NORMAL

## 2021-05-25 LAB
HPV HR 12 DNA SPEC QL NAA+PROBE: NEGATIVE
HPV16 AG SPEC QL: NEGATIVE
HPV18 DNA SPEC QL NAA+PROBE: NEGATIVE

## 2021-06-16 ENCOUNTER — PROCEDURE VISIT (OUTPATIENT)
Dept: OBSTETRICS AND GYNECOLOGY | Facility: CLINIC | Age: 31
End: 2021-06-16
Payer: COMMERCIAL

## 2021-06-16 VITALS
BODY MASS INDEX: 31.01 KG/M2 | HEART RATE: 82 BPM | RESPIRATION RATE: 12 BRPM | SYSTOLIC BLOOD PRESSURE: 114 MMHG | WEIGHT: 175 LBS | OXYGEN SATURATION: 100 % | HEIGHT: 63 IN | DIASTOLIC BLOOD PRESSURE: 72 MMHG

## 2021-06-16 DIAGNOSIS — Z30.430 ENCOUNTER FOR IUD INSERTION: Primary | ICD-10-CM

## 2021-06-16 DIAGNOSIS — Z01.812 PRE-PROCEDURE LAB EXAM: ICD-10-CM

## 2021-06-16 LAB
B-HCG UR QL: NEGATIVE
CTP QC/QA: YES

## 2021-06-16 PROCEDURE — 58300 INSERT INTRAUTERINE DEVICE: CPT | Mod: S$GLB,,, | Performed by: STUDENT IN AN ORGANIZED HEALTH CARE EDUCATION/TRAINING PROGRAM

## 2021-06-16 PROCEDURE — 99499 NO LOS: ICD-10-PCS | Mod: S$GLB,,, | Performed by: STUDENT IN AN ORGANIZED HEALTH CARE EDUCATION/TRAINING PROGRAM

## 2021-06-16 PROCEDURE — 81025 URINE PREGNANCY TEST: CPT | Mod: S$GLB,,, | Performed by: STUDENT IN AN ORGANIZED HEALTH CARE EDUCATION/TRAINING PROGRAM

## 2021-06-16 PROCEDURE — 81025 POCT URINE PREGNANCY: ICD-10-PCS | Mod: S$GLB,,, | Performed by: STUDENT IN AN ORGANIZED HEALTH CARE EDUCATION/TRAINING PROGRAM

## 2021-06-16 PROCEDURE — 99499 UNLISTED E&M SERVICE: CPT | Mod: S$GLB,,, | Performed by: STUDENT IN AN ORGANIZED HEALTH CARE EDUCATION/TRAINING PROGRAM

## 2021-06-16 PROCEDURE — 58300 INSERTION OF IUD: ICD-10-PCS | Mod: S$GLB,,, | Performed by: STUDENT IN AN ORGANIZED HEALTH CARE EDUCATION/TRAINING PROGRAM

## 2022-05-27 ENCOUNTER — OFFICE VISIT (OUTPATIENT)
Dept: OBSTETRICS AND GYNECOLOGY | Facility: CLINIC | Age: 32
End: 2022-05-27

## 2022-05-27 VITALS
HEIGHT: 63 IN | DIASTOLIC BLOOD PRESSURE: 62 MMHG | SYSTOLIC BLOOD PRESSURE: 110 MMHG | WEIGHT: 173.19 LBS | BODY MASS INDEX: 30.69 KG/M2

## 2022-05-27 DIAGNOSIS — Z11.3 SCREEN FOR STD (SEXUALLY TRANSMITTED DISEASE): Primary | ICD-10-CM

## 2022-05-27 PROCEDURE — 99213 PR OFFICE/OUTPT VISIT, EST, LEVL III, 20-29 MIN: ICD-10-PCS | Mod: S$PBB,,, | Performed by: OBSTETRICS & GYNECOLOGY

## 2022-05-27 PROCEDURE — 99213 OFFICE O/P EST LOW 20 MIN: CPT | Mod: PBBFAC | Performed by: OBSTETRICS & GYNECOLOGY

## 2022-05-27 PROCEDURE — 87591 N.GONORRHOEAE DNA AMP PROB: CPT | Performed by: OBSTETRICS & GYNECOLOGY

## 2022-05-27 PROCEDURE — 99999 PR PBB SHADOW E&M-EST. PATIENT-LVL III: ICD-10-PCS | Mod: PBBFAC,,, | Performed by: OBSTETRICS & GYNECOLOGY

## 2022-05-27 PROCEDURE — 99999 PR PBB SHADOW E&M-EST. PATIENT-LVL III: CPT | Mod: PBBFAC,,, | Performed by: OBSTETRICS & GYNECOLOGY

## 2022-05-27 PROCEDURE — 87491 CHLMYD TRACH DNA AMP PROBE: CPT | Performed by: OBSTETRICS & GYNECOLOGY

## 2022-05-27 PROCEDURE — 99213 OFFICE O/P EST LOW 20 MIN: CPT | Mod: S$PBB,,, | Performed by: OBSTETRICS & GYNECOLOGY

## 2022-05-27 NOTE — PROGRESS NOTES
Subjective:       Patient ID: Florence Hinkle is a 31 y.o. female.    Chief Complaint:  Vulvar Rash and STD CHECK      History of Present Illness  Patient presents complaining of an external vaginal rash for about 1 week.  She has been treated with Vagistat on states it is improved.  She denies any noticeable itching.  Patient has also had some cramping midline for the last approximately 1 week.  Patient is almost due to start her menstrual cycle.  Patient also request STD screening.  Counseling was done    Menstrual History:  OB History        0    Para        Term                AB        Living           SAB        IAB        Ectopic        Multiple        Live Births                    Menarche age:  Patient's last menstrual period was 2022.         Review of Systems  Review of Systems   Constitutional: Negative for activity change, appetite change, chills, diaphoresis, fatigue, fever and unexpected weight change.   HENT: Negative for congestion, dental problem, drooling, ear discharge, ear pain, facial swelling, hearing loss, mouth sores, nosebleeds, postnasal drip, rhinorrhea, sinus pressure, sneezing, sore throat, tinnitus, trouble swallowing and voice change.    Eyes: Negative for photophobia, pain, discharge, redness, itching and visual disturbance.   Respiratory: Negative for apnea, cough, choking, chest tightness, shortness of breath, wheezing and stridor.    Cardiovascular: Negative for chest pain, palpitations and leg swelling.   Gastrointestinal: Negative for abdominal distention, abdominal pain, anal bleeding, blood in stool, constipation, diarrhea, nausea, rectal pain and vomiting.   Endocrine: Negative for cold intolerance, heat intolerance, polydipsia, polyphagia and polyuria.   Genitourinary: Positive for genital sores and pelvic pain. Negative for decreased urine volume, difficulty urinating, dyspareunia, dysuria, enuresis, flank pain, frequency, hematuria, menstrual  problem, urgency, vaginal bleeding, vaginal discharge and vaginal pain.   Musculoskeletal: Negative for arthralgias, back pain, gait problem, joint swelling, myalgias, neck pain and neck stiffness.   Skin: Negative for color change, pallor, rash and wound.   Allergic/Immunologic: Negative for environmental allergies, food allergies and immunocompromised state.   Neurological: Negative for dizziness, tremors, seizures, syncope, facial asymmetry, speech difficulty, weakness, light-headedness, numbness and headaches.   Hematological: Negative for adenopathy. Does not bruise/bleed easily.   Psychiatric/Behavioral: Negative for agitation, behavioral problems, confusion, decreased concentration, dysphoric mood, hallucinations, self-injury, sleep disturbance and suicidal ideas. The patient is not nervous/anxious and is not hyperactive.            Objective:      Physical Exam  Vitals and nursing note reviewed. Exam conducted with a chaperone present.   Abdominal:      Hernia: There is no hernia in the left inguinal area or right inguinal area.   Genitourinary:     General: Normal vulva.      Labia:         Right: No rash, tenderness, lesion or injury.         Left: No rash, tenderness, lesion or injury.       Urethra: No prolapse, urethral pain, urethral swelling or urethral lesion.      Vagina: No signs of injury and foreign body. No vaginal discharge, erythema, tenderness or bleeding.      Cervix: No cervical motion tenderness, discharge or friability.      Uterus: Not deviated, not enlarged, not fixed and not tender.       Adnexa:         Right: No mass, tenderness or fullness.          Left: No mass, tenderness or fullness.        Rectum: No external hemorrhoid.       Lymphadenopathy:      Lower Body: No right inguinal adenopathy. No left inguinal adenopathy.             Assessment:        1. Screen for STD (sexually transmitted disease)                Plan:         Florence was seen today for vulvar rash and std  check.    Diagnoses and all orders for this visit:    Screen for STD (sexually transmitted disease)  -     C. trachomatis/N. gonorrhoeae by AMP DNA [facility]; Cervix

## 2022-05-30 LAB
C TRACH DNA SPEC QL NAA+PROBE: NOT DETECTED
N GONORRHOEA DNA SPEC QL NAA+PROBE: NOT DETECTED

## 2024-04-24 ENCOUNTER — TELEPHONE (OUTPATIENT)
Dept: OBSTETRICS AND GYNECOLOGY | Facility: CLINIC | Age: 34
End: 2024-04-24
Payer: COMMERCIAL

## 2024-04-24 NOTE — TELEPHONE ENCOUNTER
Contacted pt.  States she had Mirena inserted 06/16/2021.  Pt aware Mirena is good for 8years.  She is scheduled for annual on 06/03/2024

## 2024-04-24 NOTE — TELEPHONE ENCOUNTER
----- Message from Meredith Marcus sent at 4/24/2024  9:37 AM CDT -----  Contact: Self  Florence Hinkle  MRN: [id number]  Home Phone      [phone]  Work Phone      Not on file.  Mobile          [phone]    Patient Care Team:  Freda Baltazar MD as PCP - General (Family Medicine)  OB? No  What phone number can you be reached at? [phone]  Message: has questions about IUD removal

## 2024-06-03 ENCOUNTER — OFFICE VISIT (OUTPATIENT)
Dept: OBSTETRICS AND GYNECOLOGY | Facility: CLINIC | Age: 34
End: 2024-06-03
Payer: COMMERCIAL

## 2024-06-03 VITALS
SYSTOLIC BLOOD PRESSURE: 122 MMHG | HEIGHT: 63 IN | OXYGEN SATURATION: 98 % | RESPIRATION RATE: 16 BRPM | HEART RATE: 91 BPM | BODY MASS INDEX: 33.63 KG/M2 | DIASTOLIC BLOOD PRESSURE: 80 MMHG | WEIGHT: 189.81 LBS

## 2024-06-03 DIAGNOSIS — Z01.419 ENCNTR FOR GYN EXAM (GENERAL) (ROUTINE) W/O ABN FINDINGS: Primary | ICD-10-CM

## 2024-06-03 PROCEDURE — 3074F SYST BP LT 130 MM HG: CPT | Mod: CPTII,S$GLB,, | Performed by: STUDENT IN AN ORGANIZED HEALTH CARE EDUCATION/TRAINING PROGRAM

## 2024-06-03 PROCEDURE — 3079F DIAST BP 80-89 MM HG: CPT | Mod: CPTII,S$GLB,, | Performed by: STUDENT IN AN ORGANIZED HEALTH CARE EDUCATION/TRAINING PROGRAM

## 2024-06-03 PROCEDURE — 3008F BODY MASS INDEX DOCD: CPT | Mod: CPTII,S$GLB,, | Performed by: STUDENT IN AN ORGANIZED HEALTH CARE EDUCATION/TRAINING PROGRAM

## 2024-06-03 PROCEDURE — 99999 PR PBB SHADOW E&M-EST. PATIENT-LVL III: CPT | Mod: PBBFAC,,, | Performed by: STUDENT IN AN ORGANIZED HEALTH CARE EDUCATION/TRAINING PROGRAM

## 2024-06-03 PROCEDURE — 99395 PREV VISIT EST AGE 18-39: CPT | Mod: S$GLB,,, | Performed by: STUDENT IN AN ORGANIZED HEALTH CARE EDUCATION/TRAINING PROGRAM

## 2024-06-03 PROCEDURE — 1160F RVW MEDS BY RX/DR IN RCRD: CPT | Mod: CPTII,S$GLB,, | Performed by: STUDENT IN AN ORGANIZED HEALTH CARE EDUCATION/TRAINING PROGRAM

## 2024-06-03 PROCEDURE — 1159F MED LIST DOCD IN RCRD: CPT | Mod: CPTII,S$GLB,, | Performed by: STUDENT IN AN ORGANIZED HEALTH CARE EDUCATION/TRAINING PROGRAM

## 2024-06-03 RX ORDER — PANTOPRAZOLE SODIUM 40 MG/1
1 TABLET, DELAYED RELEASE ORAL DAILY
COMMUNITY

## 2024-06-03 NOTE — PROGRESS NOTES
Subjective:    Patient ID: Florence Hinkle is a 33 y.o. y.o. female.     Chief Complaint: Annual Well Woman Exam     History of Present Illness:  Florence presents today for Annual Well Woman exam. She describes her menses as  occasional spotting .She denies pelvic pain.  She denies breast tenderness, masses, nipple discharge. She denies difficulty with urination or bowel movements. She is not currently sexually active. Contraception is by IUD.      Menstrual History:   No LMP recorded (lmp unknown)..     OB History          0    Para        Term                AB        Living             SAB        IAB        Ectopic        Multiple        Live Births                     The following portions of the patient's history were reviewed and updated as appropriate: allergies, current medications, past family history, past medical history, past social history, past surgical history, and problem list.    ROS:   CONSTITUTIONAL: Negative for fever, chills, diaphoresis, weakness, fatigue, weight loss, weight gain  ENT: negative for sore throat, nasal congestion, nasal discharge, epistaxis, tinnitus, hearing loss  EYES: negative for blurry vision, decreased vision, loss of vision, eye pain, diplopia, photophobia, discharge  SKIN: Negative for rash, itching, hives  RESPIRATORY: negative for cough, hemoptysis, shortness of breath, pleuritic chest pain, wheezing  CARDIOVASCULAR: negative for chest pain, dyspnea on exertion, orthopnea, paroxysmal nocturnal dyspnea, edema, palpitations  BREAST: negative for breast  tenderness, breast mass, nipple discharge, or skin changes  GASTROINTESTINAL: negative for abdominal pain, flank pain, nausea, vomiting, diarrhea, constipation, black stool, blood in stool  GENITOURINARY: negative for abnormal vaginal bleeding, amenorrhea, decreased libido, dysuria, genital sores, hematuria, incontinence, menorrhagia, pelvic pain, urinary frequency, vaginal  discharge  HEMATOLOGIC/LYMPHATIC: negative for swollen lymph nodes, bleeding, bruising  MUSCULOSKELETAL: negative for back pain, joint pain, joint stiffness, joint swelling, muscle pain, muscle weakness  NEUROLOGICAL: negative for dizzy/vertigo, headache, focal weakness, numbness/tingling, speech problems, loss of consciousness, confusion, memory loss  BEHAVORIAL/PSYCH: negative for anxiety, depression, psychosis  ENDOCRINE: negative for polydipsia/polyuria, palpitations, skin changes, temperature intolerance, unexpected weight changes  ALLERGIC/IMMUNOLOGIC: negative for urticaria, hay fever, angioedema      Objective:    Vital Signs:  Vitals:    06/03/24 1414   BP: 122/80   Pulse: 91   Resp: 16       Physical Exam:  General:  alert, cooperative, no distress   Skin:  Skin color, texture, turgor normal. No rashes or lesions   HEENT:  conjunctivae/corneas clear. PERRL.   Neck: supple, trachea midline, no adenopathy or thyromegally   Respiratory:  Normal effort   Breasts:  no discharge, erythema, tenderness, or palpable masses; no axillary lymphadenopathy   Abdomen:  soft, nontender, no palpable masses   Pelvis: External genitalia: normal general appearance  Urinary system: urethral meatus normal, bladder nontender  Vaginal: normal mucosa without prolapse or lesions  Cervix: normal appearance, IUD string visualized  Uterus: normal size, shape, position  Adnexa: normal size, nontender bilaterally   Extremities: Normal ROM; no edema, no cyanosis   Neurologial: Normal strength and tone. No focal numbness or weakness.   Psychiatric: normal mood, speech, dress, and thought processes         Assessment:       Healthy female exam.     1. Encntr for gyn exam (general) (routine) w/o abn findings          Plan:      Problem List Items Addressed This Visit    None  Visit Diagnoses       Encntr for gyn exam (general) (routine) w/o abn findings    -  Primary              COUNSELING:  Florence was counseled on STD prevention, use and  side-effects of various contraceptive measures, A.C.O.G. Pap guidelines and recommendations for yearly pelvic exams in addition to recommendations for monthly self breast exams; to see her PCP for other health maintenance.

## 2024-07-30 ENCOUNTER — OFFICE VISIT (OUTPATIENT)
Dept: FAMILY MEDICINE | Facility: CLINIC | Age: 34
End: 2024-07-30
Payer: COMMERCIAL

## 2024-07-30 ENCOUNTER — CLINICAL SUPPORT (OUTPATIENT)
Dept: FAMILY MEDICINE | Facility: CLINIC | Age: 34
End: 2024-07-30
Payer: COMMERCIAL

## 2024-07-30 VITALS
OXYGEN SATURATION: 99 % | SYSTOLIC BLOOD PRESSURE: 106 MMHG | HEIGHT: 63 IN | RESPIRATION RATE: 18 BRPM | WEIGHT: 191 LBS | HEART RATE: 79 BPM | BODY MASS INDEX: 33.84 KG/M2 | DIASTOLIC BLOOD PRESSURE: 62 MMHG

## 2024-07-30 DIAGNOSIS — R05.1 ACUTE COUGH: Primary | ICD-10-CM

## 2024-07-30 DIAGNOSIS — U07.1 COVID-19: ICD-10-CM

## 2024-07-30 DIAGNOSIS — R05.1 ACUTE COUGH: ICD-10-CM

## 2024-07-30 DIAGNOSIS — M25.50 CHRONIC PAIN OF MULTIPLE JOINTS: ICD-10-CM

## 2024-07-30 DIAGNOSIS — F32.1 CURRENT MODERATE EPISODE OF MAJOR DEPRESSIVE DISORDER WITHOUT PRIOR EPISODE: ICD-10-CM

## 2024-07-30 DIAGNOSIS — K21.9 HIATAL HERNIA WITH GERD: ICD-10-CM

## 2024-07-30 DIAGNOSIS — Z00.00 ENCOUNTER FOR SCREENING AND PREVENTATIVE CARE: ICD-10-CM

## 2024-07-30 DIAGNOSIS — Z13.31 POSITIVE DEPRESSION SCREENING: ICD-10-CM

## 2024-07-30 DIAGNOSIS — Z13.6 ENCOUNTER FOR LIPID SCREENING FOR CARDIOVASCULAR DISEASE: ICD-10-CM

## 2024-07-30 DIAGNOSIS — G89.29 CHRONIC PAIN OF MULTIPLE JOINTS: ICD-10-CM

## 2024-07-30 DIAGNOSIS — Z13.220 ENCOUNTER FOR LIPID SCREENING FOR CARDIOVASCULAR DISEASE: ICD-10-CM

## 2024-07-30 DIAGNOSIS — K44.9 HIATAL HERNIA WITH GERD: ICD-10-CM

## 2024-07-30 DIAGNOSIS — Z13.1 DIABETES MELLITUS SCREENING: ICD-10-CM

## 2024-07-30 DIAGNOSIS — Z76.89 ENCOUNTER TO ESTABLISH CARE: Primary | ICD-10-CM

## 2024-07-30 LAB
CTP QC/QA: YES
SARS-COV-2 RDRP RESP QL NAA+PROBE: POSITIVE

## 2024-07-30 PROCEDURE — 3074F SYST BP LT 130 MM HG: CPT | Mod: CPTII,S$GLB,, | Performed by: STUDENT IN AN ORGANIZED HEALTH CARE EDUCATION/TRAINING PROGRAM

## 2024-07-30 PROCEDURE — 87635 SARS-COV-2 COVID-19 AMP PRB: CPT | Mod: QW,S$GLB,, | Performed by: STUDENT IN AN ORGANIZED HEALTH CARE EDUCATION/TRAINING PROGRAM

## 2024-07-30 PROCEDURE — 3078F DIAST BP <80 MM HG: CPT | Mod: CPTII,S$GLB,, | Performed by: STUDENT IN AN ORGANIZED HEALTH CARE EDUCATION/TRAINING PROGRAM

## 2024-07-30 PROCEDURE — 99999 PR PBB SHADOW E&M-EST. PATIENT-LVL III: CPT | Mod: PBBFAC,,, | Performed by: STUDENT IN AN ORGANIZED HEALTH CARE EDUCATION/TRAINING PROGRAM

## 2024-07-30 PROCEDURE — 3008F BODY MASS INDEX DOCD: CPT | Mod: CPTII,S$GLB,, | Performed by: STUDENT IN AN ORGANIZED HEALTH CARE EDUCATION/TRAINING PROGRAM

## 2024-07-30 PROCEDURE — 1159F MED LIST DOCD IN RCRD: CPT | Mod: CPTII,S$GLB,, | Performed by: STUDENT IN AN ORGANIZED HEALTH CARE EDUCATION/TRAINING PROGRAM

## 2024-07-30 PROCEDURE — 99204 OFFICE O/P NEW MOD 45 MIN: CPT | Mod: S$GLB,,, | Performed by: STUDENT IN AN ORGANIZED HEALTH CARE EDUCATION/TRAINING PROGRAM

## 2024-07-30 RX ORDER — FAMOTIDINE 40 MG/1
40 TABLET, FILM COATED ORAL 2 TIMES DAILY
Qty: 60 TABLET | Refills: 5 | Status: SHIPPED | OUTPATIENT
Start: 2024-07-30

## 2024-07-30 RX ORDER — NIRMATRELVIR AND RITONAVIR 300-100 MG
KIT ORAL
Qty: 30 TABLET | Refills: 0 | Status: SHIPPED | OUTPATIENT
Start: 2024-07-30 | End: 2024-08-04

## 2024-07-30 RX ORDER — FAMOTIDINE 40 MG/1
40 TABLET, FILM COATED ORAL 2 TIMES DAILY
COMMUNITY
Start: 2024-07-22 | End: 2024-07-30 | Stop reason: SDUPTHER

## 2024-07-30 RX ORDER — VENLAFAXINE HYDROCHLORIDE 37.5 MG/1
37.5 CAPSULE, EXTENDED RELEASE ORAL DAILY
Qty: 30 CAPSULE | Refills: 11 | Status: SHIPPED | OUTPATIENT
Start: 2024-07-30 | End: 2025-07-30

## 2024-07-30 NOTE — PROGRESS NOTES
Subjective:       Patient ID: Florence Hinkle is a 33 y.o. female.    Chief Complaint: Establish Care (Patient is here today to establish care. )    Pt here to establish care.     She is a daily smoker but is not ready to quit.    She has GERD and hiatal hernia and is on protonix 40mg daily and pepcid 40mg BID. She was seen by GI, reports last EGD within 5 years. She recently had the pepcid added for poor control on protonix alone.    She has fibromyalgia and chronic neuropathic pain. She wants to work. She has to be on her feet for about 9 hours daily. She has pain in her knee and ankles. She was seen by pain management in the past and they did not want her to be on chronic pain medication although she was with her prior PCP in the past.     She has anxiety and depressed mood. She took cymbalta in the past but didn't like the way it made her feel.    Review of Systems   Constitutional:  Negative for chills and fever.   HENT:  Negative for congestion and sore throat.    Respiratory:  Negative for cough and shortness of breath.    Cardiovascular:  Negative for chest pain.   Gastrointestinal:  Negative for abdominal pain, diarrhea, nausea and vomiting.   Genitourinary:  Negative for dysuria and hematuria.   Musculoskeletal:  Positive for arthralgias.   Neurological:  Negative for dizziness, syncope and light-headedness.   Psychiatric/Behavioral:  Negative for confusion.        Objective:      Physical Exam  Vitals reviewed.   Constitutional:       General: She is not in acute distress.  HENT:      Head: Normocephalic and atraumatic.   Eyes:      Conjunctiva/sclera: Conjunctivae normal.   Cardiovascular:      Rate and Rhythm: Normal rate and regular rhythm.      Heart sounds: Normal heart sounds. No murmur heard.  Pulmonary:      Effort: Pulmonary effort is normal. No respiratory distress.      Breath sounds: Normal breath sounds.   Neurological:      General: No focal deficit present.      Mental Status: She is  alert and oriented to person, place, and time.   Psychiatric:         Mood and Affect: Mood normal.         Behavior: Behavior normal.         Assessment:       1. Encounter to establish care    2. Positive depression screening    3. Encounter for lipid screening for cardiovascular disease    4. Chronic pain of multiple joints    5. Diabetes mellitus screening    6. Encounter for screening and preventative care    7. Current moderate episode of major depressive disorder without prior episode    8. Hiatal hernia with GERD    9. Acute cough    10. COVID-19        Plan:           1. Encounter to establish care    2. Positive depression screening  Comments:  I have reviewed the positive depression score which warrants active treatment with psychotherapy and/or medications.    3. Encounter for lipid screening for cardiovascular disease  -     Lipid Panel; Future; Expected date: 07/30/2024    4. Chronic pain of multiple joints  -     CBC Auto Differential; Future; Expected date: 07/30/2024  -     Comprehensive Metabolic Panel; Future; Expected date: 07/30/2024  -     TSH; Future; Expected date: 07/30/2024  -     ROMÁN; Future; Expected date: 07/30/2024  -     Sedimentation rate; Future; Expected date: 07/30/2024  -     C-REACTIVE PROTEIN; Future; Expected date: 07/30/2024    5. Diabetes mellitus screening  -     Hemoglobin A1C; Future; Expected date: 07/30/2024    6. Encounter for screening and preventative care  -     CBC Auto Differential; Future; Expected date: 07/30/2024  -     Comprehensive Metabolic Panel; Future; Expected date: 07/30/2024  -     Lipid Panel; Future; Expected date: 07/30/2024    7. Current moderate episode of major depressive disorder without prior episode  -     venlafaxine (EFFEXOR-XR) 37.5 MG 24 hr capsule; Take 1 capsule (37.5 mg total) by mouth once daily.  Dispense: 30 capsule; Refill: 11    8. Hiatal hernia with GERD  -     famotidine (PEPCID) 40 MG tablet; Take 1 tablet (40 mg total) by mouth  2 (two) times daily.  Dispense: 60 tablet; Refill: 5  -     H. pylori antigen, stool; Future; Expected date: 07/30/2024    9. Acute cough  -     POCT COVID-19 Rapid Screening    10. COVID-19  -     nirmatrelvir-ritonavir (PAXLOVID) 300 mg (150 mg x 2)-100 mg copackaged tablets (EUA); Take 3 tablets by mouth 2 (two) times daily. Each dose contains 2 nirmatrelvir (pink tablets) and 1 ritonavir (white tablet). Take all 3 tablets together  Dispense: 30 tablet; Refill: 0    Covid 19: positive    She would like paxlovid  Labs as ordered  Start effexor   Check h pylori  RTC 1 month or sooner if needed

## 2024-07-30 NOTE — LETTER
July 30, 2024    Florence Hinkle  [address] [facility] [address]             [facility]  [address] [place] [id number]  Phone: [phone]  Fax: [phone]   July 30, 2024     Patient: Florence Hinkle   YOB: 1990   Date of Visit: 7/30/2024       To Whom it May Concern:    Florence Hinkle was seen in my clinic on 7/30/2024. She may return to work on 08/05/2024 .    Please excuse her from any classes or work missed.    If you have any questions or concerns, please don't hesitate to call.    Sincerely,       CAROLEE Tidwell Michael C., MD

## 2024-08-15 ENCOUNTER — LAB VISIT (OUTPATIENT)
Dept: LAB | Facility: HOSPITAL | Age: 34
End: 2024-08-15
Attending: STUDENT IN AN ORGANIZED HEALTH CARE EDUCATION/TRAINING PROGRAM
Payer: COMMERCIAL

## 2024-08-15 DIAGNOSIS — M25.50 CHRONIC PAIN OF MULTIPLE JOINTS: ICD-10-CM

## 2024-08-15 DIAGNOSIS — Z00.00 ENCOUNTER FOR SCREENING AND PREVENTATIVE CARE: ICD-10-CM

## 2024-08-15 DIAGNOSIS — Z13.220 ENCOUNTER FOR LIPID SCREENING FOR CARDIOVASCULAR DISEASE: ICD-10-CM

## 2024-08-15 DIAGNOSIS — Z13.6 ENCOUNTER FOR LIPID SCREENING FOR CARDIOVASCULAR DISEASE: ICD-10-CM

## 2024-08-15 DIAGNOSIS — Z13.1 DIABETES MELLITUS SCREENING: ICD-10-CM

## 2024-08-15 DIAGNOSIS — G89.29 CHRONIC PAIN OF MULTIPLE JOINTS: ICD-10-CM

## 2024-08-15 LAB
ALBUMIN SERPL BCP-MCNC: 3.9 G/DL (ref 3.5–5.2)
ALP SERPL-CCNC: 138 U/L (ref 55–135)
ALT SERPL W/O P-5'-P-CCNC: 27 U/L (ref 10–44)
ANION GAP SERPL CALC-SCNC: 9 MMOL/L (ref 8–16)
AST SERPL-CCNC: 21 U/L (ref 10–40)
BASOPHILS # BLD AUTO: 0.05 K/UL (ref 0–0.2)
BASOPHILS NFR BLD: 0.5 % (ref 0–1.9)
BILIRUB SERPL-MCNC: 0.2 MG/DL (ref 0.1–1)
BUN SERPL-MCNC: 14 MG/DL (ref 6–20)
CALCIUM SERPL-MCNC: 9.4 MG/DL (ref 8.7–10.5)
CHLORIDE SERPL-SCNC: 102 MMOL/L (ref 95–110)
CHOLEST SERPL-MCNC: 183 MG/DL (ref 120–199)
CHOLEST/HDLC SERPL: 4.6 {RATIO} (ref 2–5)
CO2 SERPL-SCNC: 26 MMOL/L (ref 23–29)
CREAT SERPL-MCNC: 0.8 MG/DL (ref 0.5–1.4)
CRP SERPL-MCNC: 7.5 MG/L (ref 0–8.2)
DIFFERENTIAL METHOD BLD: ABNORMAL
EOSINOPHIL # BLD AUTO: 0.1 K/UL (ref 0–0.5)
EOSINOPHIL NFR BLD: 1.4 % (ref 0–8)
ERYTHROCYTE [DISTWIDTH] IN BLOOD BY AUTOMATED COUNT: 11.9 % (ref 11.5–14.5)
ERYTHROCYTE [SEDIMENTATION RATE] IN BLOOD BY WESTERGREN METHOD: 15 MM/HR (ref 0–20)
EST. GFR  (NO RACE VARIABLE): >60 ML/MIN/1.73 M^2
ESTIMATED AVG GLUCOSE: 100 MG/DL (ref 68–131)
GLUCOSE SERPL-MCNC: 91 MG/DL (ref 70–110)
HBA1C MFR BLD: 5.1 % (ref 4–5.6)
HCT VFR BLD AUTO: 41 % (ref 37–48.5)
HDLC SERPL-MCNC: 40 MG/DL (ref 40–75)
HDLC SERPL: 21.9 % (ref 20–50)
HGB BLD-MCNC: 14.6 G/DL (ref 12–16)
IMM GRANULOCYTES # BLD AUTO: 0.03 K/UL (ref 0–0.04)
IMM GRANULOCYTES NFR BLD AUTO: 0.3 % (ref 0–0.5)
LDLC SERPL CALC-MCNC: 130.4 MG/DL (ref 63–159)
LYMPHOCYTES # BLD AUTO: 2.8 K/UL (ref 1–4.8)
LYMPHOCYTES NFR BLD: 28.2 % (ref 18–48)
MCH RBC QN AUTO: 31.1 PG (ref 27–31)
MCHC RBC AUTO-ENTMCNC: 35.6 G/DL (ref 32–36)
MCV RBC AUTO: 87 FL (ref 82–98)
MONOCYTES # BLD AUTO: 0.5 K/UL (ref 0.3–1)
MONOCYTES NFR BLD: 5.2 % (ref 4–15)
NEUTROPHILS # BLD AUTO: 6.3 K/UL (ref 1.8–7.7)
NEUTROPHILS NFR BLD: 64.4 % (ref 38–73)
NONHDLC SERPL-MCNC: 143 MG/DL
NRBC BLD-RTO: 0 /100 WBC
PLATELET # BLD AUTO: 296 K/UL (ref 150–450)
PMV BLD AUTO: 9.6 FL (ref 9.2–12.9)
POTASSIUM SERPL-SCNC: 4.1 MMOL/L (ref 3.5–5.1)
PROT SERPL-MCNC: 7.1 G/DL (ref 6–8.4)
RBC # BLD AUTO: 4.69 M/UL (ref 4–5.4)
SODIUM SERPL-SCNC: 137 MMOL/L (ref 136–145)
TRIGL SERPL-MCNC: 63 MG/DL (ref 30–150)
TSH SERPL DL<=0.005 MIU/L-ACNC: 1.22 UIU/ML (ref 0.4–4)
WBC # BLD AUTO: 9.85 K/UL (ref 3.9–12.7)

## 2024-08-15 PROCEDURE — 86038 ANTINUCLEAR ANTIBODIES: CPT | Performed by: STUDENT IN AN ORGANIZED HEALTH CARE EDUCATION/TRAINING PROGRAM

## 2024-08-15 PROCEDURE — 83036 HEMOGLOBIN GLYCOSYLATED A1C: CPT | Performed by: STUDENT IN AN ORGANIZED HEALTH CARE EDUCATION/TRAINING PROGRAM

## 2024-08-15 PROCEDURE — 85025 COMPLETE CBC W/AUTO DIFF WBC: CPT | Performed by: STUDENT IN AN ORGANIZED HEALTH CARE EDUCATION/TRAINING PROGRAM

## 2024-08-15 PROCEDURE — 80053 COMPREHEN METABOLIC PANEL: CPT | Performed by: STUDENT IN AN ORGANIZED HEALTH CARE EDUCATION/TRAINING PROGRAM

## 2024-08-15 PROCEDURE — 36415 COLL VENOUS BLD VENIPUNCTURE: CPT | Performed by: STUDENT IN AN ORGANIZED HEALTH CARE EDUCATION/TRAINING PROGRAM

## 2024-08-15 PROCEDURE — 85651 RBC SED RATE NONAUTOMATED: CPT | Performed by: STUDENT IN AN ORGANIZED HEALTH CARE EDUCATION/TRAINING PROGRAM

## 2024-08-15 PROCEDURE — 86140 C-REACTIVE PROTEIN: CPT | Performed by: STUDENT IN AN ORGANIZED HEALTH CARE EDUCATION/TRAINING PROGRAM

## 2024-08-15 PROCEDURE — 80061 LIPID PANEL: CPT | Performed by: STUDENT IN AN ORGANIZED HEALTH CARE EDUCATION/TRAINING PROGRAM

## 2024-08-15 PROCEDURE — 84443 ASSAY THYROID STIM HORMONE: CPT | Performed by: STUDENT IN AN ORGANIZED HEALTH CARE EDUCATION/TRAINING PROGRAM

## 2024-08-19 ENCOUNTER — LAB VISIT (OUTPATIENT)
Dept: LAB | Facility: HOSPITAL | Age: 34
End: 2024-08-19
Attending: STUDENT IN AN ORGANIZED HEALTH CARE EDUCATION/TRAINING PROGRAM
Payer: COMMERCIAL

## 2024-08-19 DIAGNOSIS — K44.9 HIATAL HERNIA WITH GERD: ICD-10-CM

## 2024-08-19 DIAGNOSIS — K21.9 HIATAL HERNIA WITH GERD: ICD-10-CM

## 2024-08-19 LAB — ANA SER QL IF: NORMAL

## 2024-08-19 PROCEDURE — 87338 HPYLORI STOOL AG IA: CPT | Performed by: STUDENT IN AN ORGANIZED HEALTH CARE EDUCATION/TRAINING PROGRAM

## 2024-09-25 ENCOUNTER — OFFICE VISIT (OUTPATIENT)
Dept: FAMILY MEDICINE | Facility: CLINIC | Age: 34
End: 2024-09-25
Payer: COMMERCIAL

## 2024-09-25 ENCOUNTER — TELEPHONE (OUTPATIENT)
Dept: FAMILY MEDICINE | Facility: CLINIC | Age: 34
End: 2024-09-25

## 2024-09-25 ENCOUNTER — CLINICAL SUPPORT (OUTPATIENT)
Dept: FAMILY MEDICINE | Facility: CLINIC | Age: 34
End: 2024-09-25
Payer: COMMERCIAL

## 2024-09-25 VITALS
HEIGHT: 63 IN | BODY MASS INDEX: 32.81 KG/M2 | SYSTOLIC BLOOD PRESSURE: 106 MMHG | OXYGEN SATURATION: 97 % | HEART RATE: 101 BPM | RESPIRATION RATE: 16 BRPM | WEIGHT: 185.19 LBS | DIASTOLIC BLOOD PRESSURE: 62 MMHG

## 2024-09-25 DIAGNOSIS — E66.9 CLASS 1 OBESITY WITH SERIOUS COMORBIDITY AND BODY MASS INDEX (BMI) OF 32.0 TO 32.9 IN ADULT, UNSPECIFIED OBESITY TYPE: ICD-10-CM

## 2024-09-25 DIAGNOSIS — R35.0 URINARY FREQUENCY: ICD-10-CM

## 2024-09-25 DIAGNOSIS — G47.30 SLEEP APNEA, UNSPECIFIED TYPE: ICD-10-CM

## 2024-09-25 DIAGNOSIS — F32.1 CURRENT MODERATE EPISODE OF MAJOR DEPRESSIVE DISORDER WITHOUT PRIOR EPISODE: Primary | ICD-10-CM

## 2024-09-25 DIAGNOSIS — D17.23 LIPOMA OF RIGHT LOWER EXTREMITY: ICD-10-CM

## 2024-09-25 DIAGNOSIS — K44.9 HIATAL HERNIA WITH GERD: ICD-10-CM

## 2024-09-25 DIAGNOSIS — R06.83 LOUD SNORING: ICD-10-CM

## 2024-09-25 DIAGNOSIS — K21.9 HIATAL HERNIA WITH GERD: ICD-10-CM

## 2024-09-25 DIAGNOSIS — R40.0 DAYTIME SOMNOLENCE: ICD-10-CM

## 2024-09-25 PROBLEM — E66.811 CLASS 1 OBESITY WITH SERIOUS COMORBIDITY AND BODY MASS INDEX (BMI) OF 32.0 TO 32.9 IN ADULT: Status: ACTIVE | Noted: 2024-09-25

## 2024-09-25 LAB
BACTERIA SPEC CULT: NORMAL /HPF
BILIRUB SERPL-MCNC: NORMAL MG/DL
BLOOD URINE, POC: NORMAL
CASTS: NORMAL
COLOR, POC UA: YELLOW
CRYSTALS: NORMAL
GLUCOSE UR QL STRIP: NORMAL
KETONES UR QL STRIP: NORMAL
LEUKOCYTE ESTERASE URINE, POC: NORMAL
NITRITE, POC UA: NORMAL
PH, POC UA: 5.5
PROTEIN, POC: NORMAL
RBC CELLS COUNTED: NORMAL
SPECIFIC GRAVITY, POC UA: 1.01
UROBILINOGEN, POC UA: 0.2
WHITE BLOOD CELLS: NORMAL

## 2024-09-25 PROCEDURE — 3044F HG A1C LEVEL LT 7.0%: CPT | Mod: CPTII,S$GLB,, | Performed by: STUDENT IN AN ORGANIZED HEALTH CARE EDUCATION/TRAINING PROGRAM

## 2024-09-25 PROCEDURE — 99999 PR PBB SHADOW E&M-EST. PATIENT-LVL IV: CPT | Mod: PBBFAC,,, | Performed by: STUDENT IN AN ORGANIZED HEALTH CARE EDUCATION/TRAINING PROGRAM

## 2024-09-25 PROCEDURE — 1159F MED LIST DOCD IN RCRD: CPT | Mod: CPTII,S$GLB,, | Performed by: STUDENT IN AN ORGANIZED HEALTH CARE EDUCATION/TRAINING PROGRAM

## 2024-09-25 PROCEDURE — 3074F SYST BP LT 130 MM HG: CPT | Mod: CPTII,S$GLB,, | Performed by: STUDENT IN AN ORGANIZED HEALTH CARE EDUCATION/TRAINING PROGRAM

## 2024-09-25 PROCEDURE — 99214 OFFICE O/P EST MOD 30 MIN: CPT | Mod: S$GLB,,, | Performed by: STUDENT IN AN ORGANIZED HEALTH CARE EDUCATION/TRAINING PROGRAM

## 2024-09-25 PROCEDURE — 3078F DIAST BP <80 MM HG: CPT | Mod: CPTII,S$GLB,, | Performed by: STUDENT IN AN ORGANIZED HEALTH CARE EDUCATION/TRAINING PROGRAM

## 2024-09-25 PROCEDURE — 3008F BODY MASS INDEX DOCD: CPT | Mod: CPTII,S$GLB,, | Performed by: STUDENT IN AN ORGANIZED HEALTH CARE EDUCATION/TRAINING PROGRAM

## 2024-09-25 RX ORDER — TIRZEPATIDE 2.5 MG/.5ML
2.5 INJECTION, SOLUTION SUBCUTANEOUS
Qty: 4 PEN | Refills: 2 | Status: SHIPPED | OUTPATIENT
Start: 2024-09-25

## 2024-09-25 RX ORDER — DEXLANSOPRAZOLE 60 MG/1
60 CAPSULE, DELAYED RELEASE ORAL DAILY
Qty: 30 CAPSULE | Refills: 5 | Status: SHIPPED | OUTPATIENT
Start: 2024-09-25 | End: 2025-03-24

## 2024-09-25 NOTE — PROGRESS NOTES
Subjective:       Patient ID: Florence Hinkle is a 33 y.o. female.    Chief Complaint: Follow-up (Patient is here today for a 2 month follow up visit. )    Pt here for follow-up    She has anxiety and depressed mood. She took cymbalta in the past but didn't like the way it made her feel. At her last visit, we started effexor 37.5mg daily which she is tolerating well. She has noticed improvement in her mood as well as her chronic pain.     GERD/hiatal hernia: she is not having any improvement on the pepcid. She previously failed protonix.    She remains very fatigued. She has trouble falling and staying asleep. She snores. She is tired all the time and during the day.     She also reports urinary frequency and feels like she cannot empty her bladder. No dysuria of hematuria.     She is also worried about her weight and is interested in weight loss medications.     She also reports cyst to posterior right thigh for many years that has become bothersome and often gets irritated. She is requesting referral to derm for potential removal.    Review of Systems   Constitutional:  Negative for chills and fever.   HENT:  Negative for congestion and sore throat.    Respiratory:  Negative for cough and shortness of breath.    Cardiovascular:  Negative for chest pain.   Gastrointestinal:  Negative for abdominal pain, diarrhea, nausea and vomiting.   Genitourinary:  Negative for dysuria and hematuria.   Musculoskeletal:  Positive for arthralgias.   Neurological:  Negative for dizziness, syncope and light-headedness.   Psychiatric/Behavioral:  Negative for confusion.        Objective:      Physical Exam  Vitals reviewed.   Constitutional:       General: She is not in acute distress.  HENT:      Head: Normocephalic and atraumatic.   Eyes:      Conjunctiva/sclera: Conjunctivae normal.   Cardiovascular:      Rate and Rhythm: Normal rate and regular rhythm.      Heart sounds: Normal heart sounds. No murmur heard.  Pulmonary:       Effort: Pulmonary effort is normal. No respiratory distress.      Breath sounds: Normal breath sounds.   Skin:     Comments: Soft, nontender mass posterior right thigh without overlying skin changes   Neurological:      General: No focal deficit present.      Mental Status: She is alert and oriented to person, place, and time.   Psychiatric:         Mood and Affect: Mood normal.         Behavior: Behavior normal.         Assessment:       1. Sleep apnea, unspecified type    2. Daytime somnolence    3. Loud snoring    4. Hiatal hernia with GERD    5. Lipoma of right lower extremity    6. Urinary frequency    7. Class 1 obesity with serious comorbidity and body mass index (BMI) of 32.0 to 32.9 in adult, unspecified obesity type          Plan:           1. Sleep apnea, unspecified type  -     Polysomnogram (CPAP will be added if patient meets diagnostic criteria.); Future    2. Daytime somnolence  -     Polysomnogram (CPAP will be added if patient meets diagnostic criteria.); Future    3. Loud snoring  -     Polysomnogram (CPAP will be added if patient meets diagnostic criteria.); Future    4. Hiatal hernia with GERD  -     dexlansoprazole (DEXILANT) 60 mg capsule; Take 1 capsule (60 mg total) by mouth once daily.  Dispense: 30 capsule; Refill: 5    5. Lipoma of right lower extremity  -     Ambulatory referral/consult to Dermatology; Future; Expected date: 10/02/2024    6. Urinary frequency  -     POCT URINE DIPSTICK WITH MICROSCOPE, AUTOMATED; Future; Expected date: 09/25/2024  -     POCT URINE SEDIMENT EXAM; Future; Expected date: 09/25/2024    7. Class 1 obesity with serious comorbidity and body mass index (BMI) of 32.0 to 32.9 in adult, unspecified obesity type  -     tirzepatide, weight loss, (ZEPBOUND) 2.5 mg/0.5 mL PnIj; Inject 2.5 mg into the skin every 7 days.  Dispense: 4 Pen; Refill: 2      Cont effexor 37.5mg daily  Check sleep study  Pending above, consider remeron or trazodone for insomnia  Failed pepcid  and protonix, start dexilant  Start zepbound for weight loss  Refer derm for suspeced lipoma to posterior right thigh  RTC 2 months or sooner if needed

## 2024-10-02 ENCOUNTER — TELEPHONE (OUTPATIENT)
Dept: FAMILY MEDICINE | Facility: CLINIC | Age: 34
End: 2024-10-02
Payer: COMMERCIAL

## 2024-10-02 DIAGNOSIS — F32.1 CURRENT MODERATE EPISODE OF MAJOR DEPRESSIVE DISORDER WITHOUT PRIOR EPISODE: Primary | ICD-10-CM

## 2024-10-02 RX ORDER — VENLAFAXINE HYDROCHLORIDE 75 MG/1
75 CAPSULE, EXTENDED RELEASE ORAL DAILY
Qty: 30 CAPSULE | Refills: 2 | Status: SHIPPED | OUTPATIENT
Start: 2024-10-02 | End: 2024-12-31

## 2024-10-02 NOTE — TELEPHONE ENCOUNTER
----- Message from Luisito sent at 10/2/2024  9:00 AM CDT -----  Contact: pt  Florence Hinkle  MRN: [id number]  : 1990  PCP: Arslan Nicholson  Home Phone      [phone]  Work Phone      Not on file.  Mobile          [phone]      MESSAGE:     Pt called wanting to speak with nurse about upping the dosage of medication venlafaxine (EFFEXOR-XR) 37.5 MG 24 hr capsule. Pt stated the medication isn't working on this dosage. Pt is leaving for [facility].        Please advise  [phone]

## 2024-10-02 NOTE — TELEPHONE ENCOUNTER
Pt states on venlafaxine (EFFEXOR-XR) 37.5 MG 24 hr capsule is not working depression and pain are both worse and she has to leave for [facility] unsure of how long shell be gone

## 2024-10-15 DIAGNOSIS — G47.30 SLEEP APNEA, UNSPECIFIED: Primary | ICD-10-CM

## 2024-10-30 ENCOUNTER — TELEPHONE (OUTPATIENT)
Dept: FAMILY MEDICINE | Facility: CLINIC | Age: 34
End: 2024-10-30
Payer: COMMERCIAL

## 2024-12-03 ENCOUNTER — TELEPHONE (OUTPATIENT)
Dept: FAMILY MEDICINE | Facility: CLINIC | Age: 34
End: 2024-12-03

## 2024-12-03 NOTE — TELEPHONE ENCOUNTER
----- Message from Luisito sent at 12/3/2024  8:41 AM CST -----  Contact: pt  Florence Hinkle  MRN: [id number]  : 1990  PCP: Arslan Nicholson  Home Phone      [phone]  Work Phone      Not on file.  Mobile          [phone]      MESSAGE:     Pt called requesting a virtual visit appt with Dr. Nicholson. Pt stated she is stationed in [place] for work and would need her medications. LOV 24            Please advise  [phone]

## 2024-12-09 ENCOUNTER — TELEPHONE (OUTPATIENT)
Dept: FAMILY MEDICINE | Facility: CLINIC | Age: 34
End: 2024-12-09

## 2024-12-09 NOTE — TELEPHONE ENCOUNTER
----- Message from Willam sent at 2024 12:21 PM CST -----  Contact: self  Florence Hinkle  MRN: [id number]  : 1990  PCP: Arslan Nicholson  Home Phone      [phone]  Work Phone      Not on file.  Mobile          [phone]      MESSAGE:   Pt states she needs to see doctor on the  or  that's the only day she can see him pt would like to speak with nurse    Please advise  [phone]

## 2024-12-25 DIAGNOSIS — F32.1 CURRENT MODERATE EPISODE OF MAJOR DEPRESSIVE DISORDER WITHOUT PRIOR EPISODE: ICD-10-CM

## 2024-12-25 NOTE — TELEPHONE ENCOUNTER
Care Due:                  Date            Visit Type   Department     Provider  --------------------------------------------------------------------------------                                EP -                              [facility]  Last Visit: 09-      CARE ([facility])   MEDICINE       Arslan Nicholson                              EP -                              [facility]  Next Visit: 12-      CARE ([facility])   MEDICINE       Arslan Nicholson                                                            Last  Test          Frequency    Reason                     Performed    Due Date  --------------------------------------------------------------------------------    HBA1C.......  6 months...  tirzepatide,.............  08- 02-    Health [facility] Embedded Care Due Messages. Reference number: [id number].   12/25/2024 12:48:34 AM CST

## 2024-12-26 ENCOUNTER — OFFICE VISIT (OUTPATIENT)
Dept: FAMILY MEDICINE | Facility: CLINIC | Age: 34
End: 2024-12-26

## 2024-12-26 VITALS
OXYGEN SATURATION: 97 % | HEIGHT: 63 IN | WEIGHT: 187.81 LBS | BODY MASS INDEX: 33.28 KG/M2 | RESPIRATION RATE: 16 BRPM | HEART RATE: 92 BPM | DIASTOLIC BLOOD PRESSURE: 70 MMHG | SYSTOLIC BLOOD PRESSURE: 98 MMHG

## 2024-12-26 DIAGNOSIS — F32.1 CURRENT MODERATE EPISODE OF MAJOR DEPRESSIVE DISORDER WITHOUT PRIOR EPISODE: Primary | ICD-10-CM
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
## 2024-12-26 DIAGNOSIS — F41.1 GAD (GENERALIZED ANXIETY DISORDER): ICD-10-CM

## 2024-12-26 PROCEDURE — 99999 PR PBB SHADOW E&M-EST. PATIENT-LVL III: CPT | Mod: PBBFAC,,, | Performed by: STUDENT IN AN ORGANIZED HEALTH CARE EDUCATION/TRAINING PROGRAM

## 2024-12-26 PROCEDURE — 99213 OFFICE O/P EST LOW 20 MIN: CPT | Mod: PBBFAC | Performed by: STUDENT IN AN ORGANIZED HEALTH CARE EDUCATION/TRAINING PROGRAM

## 2024-12-26 PROCEDURE — 99214 OFFICE O/P EST MOD 30 MIN: CPT | Mod: S$PBB,,, | Performed by: STUDENT IN AN ORGANIZED HEALTH CARE EDUCATION/TRAINING PROGRAM

## 2024-12-26 RX ORDER — CLONAZEPAM 0.5 MG/1
0.5 TABLET ORAL DAILY PRN
Qty: 10 TABLET | Refills: 1 | Status: SHIPPED | OUTPATIENT
Start: 2024-12-26 | End: 2025-12-26

## 2024-12-26 RX ORDER — METRONIDAZOLE 500 MG/1
500 TABLET ORAL EVERY 8 HOURS
Qty: 15 TABLET | Refills: 0 | Status: SHIPPED | OUTPATIENT
Start: 2024-12-26 | End: 2024-12-31

## 2024-12-26 RX ORDER — VENLAFAXINE HYDROCHLORIDE 75 MG/1
75 CAPSULE, EXTENDED RELEASE ORAL DAILY
Qty: 90 CAPSULE | Refills: 1 | Status: SHIPPED | OUTPATIENT
Start: 2024-12-26 | End: 2025-06-24

## 2024-12-26 RX ORDER — DOXYCYCLINE HYCLATE 100 MG
100 TABLET ORAL 2 TIMES DAILY
Qty: 10 TABLET | Refills: 0 | Status: SHIPPED | OUTPATIENT
Start: 2024-12-26 | End: 2024-12-31

## 2024-12-26 RX ORDER — VENLAFAXINE HYDROCHLORIDE 75 MG/1
75 CAPSULE, EXTENDED RELEASE ORAL DAILY
Qty: 90 CAPSULE | Refills: 2 | OUTPATIENT
Start: 2024-12-26

## 2024-12-26 NOTE — PROGRESS NOTES
Subjective:       Patient ID: Florence Hinkle is a 34 y.o. female.    Chief Complaint: Follow-up (Pt is here today for a check up)    Pt here for follow-up    GERSON/MDD. She is currently on effexor 75mg daily (increased from 37.5mg daily) and she is tolerating well. She reports her anxiety is through the roof and she is requesting to resume klonopin which she took PRN panic in the past with a previous provider. 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 She expressed frustration that I would not be able to do virtual visits with her while she was out of state in [place]. She reports she knows her body and klonopin is the only thing that helps.    She remains fatigued but declines to complete sleep apnea testing which was ordered previously.    She reports her cat bit her left index finger 2 days ago. She was unable to bend her finger yesterday due to pain, but today FROM. No fever.     Per last visit note:  GERSON/MDD: She has anxiety and depressed mood. She took cymbalta in the past but didn't like the way it made her feel. At her last visit, we started effexor 37.5mg daily which she is tolerating well. She has noticed improvement in her mood as well as her chronic pain.     Review of Systems   Constitutional:  Negative for chills and fever.   HENT:  Negative for congestion and sore throat.    Respiratory:  Negative for cough and shortness of breath.    Cardiovascular:  Negative for chest pain.   Gastrointestinal:  Negative for abdominal pain, diarrhea, nausea and vomiting.   Genitourinary:  Negative for dysuria and hematuria.   Skin:         Cat bite left hand   Neurological:  Negative for dizziness, syncope and light-headedness.   Psychiatric/Behavioral:  Positive for dysphoric mood. Negative for confusion. The patient is nervous/anxious.        Objective:      Physical Exam  Vitals reviewed.   Constitutional:       General: She is not in acute  distress.  HENT:      Head: Normocephalic and atraumatic.   Eyes:      Conjunctiva/sclera: Conjunctivae normal.   Cardiovascular:      Rate and Rhythm: Normal rate and regular rhythm.      Heart sounds: Normal heart sounds. No murmur heard.  Pulmonary:      Effort: Pulmonary effort is normal. No respiratory distress.      Breath sounds: Normal breath sounds.   Skin:     Comments: Puncture wound to left index finger without significant surrounding erythema, swelling or fluctuance   Neurological:      General: No focal deficit present.      Mental Status: She is alert and oriented to person, place, and time.   Psychiatric:      Comments: Anxious initially, then irate         Assessment:       1. Current moderate episode of major depressive disorder without prior episode    2. GERSON (generalized anxiety disorder)    3. Cat bite, initial encounter            Plan:           1. Current moderate episode of major depressive disorder without prior episode  -     venlafaxine (EFFEXOR-XR) 75 MG 24 hr capsule; Take 1 capsule (75 mg total) by mouth once daily.  Dispense: 90 capsule; Refill: 1    2. GERSON (generalized anxiety disorder)  -     clonazePAM (KLONOPIN) 0.5 MG tablet; Take 1 tablet (0.5 mg total) by mouth daily as needed (panic).  Dispense: 10 tablet; Refill: 1    3. Cat bite, initial encounter  -     metroNIDAZOLE (FLAGYL) 500 MG tablet; Take 1 tablet (500 mg total) by mouth every 8 (eight) hours. for 5 days  Dispense: 15 tablet; Refill: 0  -     doxycycline (VIBRA-TABS) 100 MG tablet; Take 1 tablet (100 mg total) by mouth 2 (two) times daily. for 5 days  Dispense: 10 tablet; Refill: 0    Cont effexor 75mg daily  Refuses sleep study as she does not think she has sleep apnea and reports she does not have time to complete sleep study  Will provide klonopin to use PRN severe panic in light of increased anxiety despite effexor    Abx as ordered for cat bite    RTC 3 months or sooner if needed    **Called back into room by  staff as patient wanted to discuss klonopin prescription. Pt reporting ten 0.5mg klonopin tablets would not be enough medication for her. She states she has taken the medication in the past and would need more. I advised patient that she had not been on klonopin in over 2 years based on  and her report. Pt states that is correct, but she knows her body and would need a higher dose and more tablets. I attempted to explain to her that since she had not been on the medication in over 2 years that we would start the medication at a lower dose and I would only recommend her to take the medication as needed for severe panic symptoms. Pt continued to argue and was yelling at me so the visit was ended

## 2024-12-26 NOTE — TELEPHONE ENCOUNTER
Refill Routing Note   Medication(s) are not appropriate for processing by [facility] for the following reason(s):        New or recently adjusted medication    [facility] action(s):  Defer     Requires labs : Yes             Appointments  past 12m or future 3m with PCP    Date Provider   Last Visit   9/25/2024 Arslan Nicholson MD   Next Visit   12/26/2024 Arslan Nicholson MD   ED visits in past 90 days: 0        Note composed:8:24 AM 12/26/2024

## 2025-03-25 DIAGNOSIS — K44.9 HIATAL HERNIA WITH GERD: ICD-10-CM

## 2025-03-25 DIAGNOSIS — K21.9 HIATAL HERNIA WITH GERD: ICD-10-CM

## 2025-03-25 RX ORDER — DEXLANSOPRAZOLE 60 MG/1
1 CAPSULE, DELAYED RELEASE ORAL
Qty: 30 CAPSULE | Refills: 2 | Status: SHIPPED | OUTPATIENT
Start: 2025-03-25

## 2025-03-25 NOTE — TELEPHONE ENCOUNTER
No care due was identified.  Health [facility] Embedded Care Due Messages. Reference number: [id number].   3/25/2025 10:41:01 AM CDT

## 2025-03-25 NOTE — TELEPHONE ENCOUNTER
Refill Routing Note   Medication(s) are not appropriate for processing by [facility] for the following reason(s):        Outside of protocol    [facility] action(s):  Route               Appointments  past 12m or future 3m with PCP    Date Provider   Last Visit   12/26/2024 Arslan Nicholson MD   Next Visit   Visit date not found Arslan Nicholson MD   ED visits in past 90 days: 0        Note composed:11:13 AM 03/25/2025

## 2025-05-18 DIAGNOSIS — K21.9 HIATAL HERNIA WITH GERD: ICD-10-CM

## 2025-05-18 DIAGNOSIS — K44.9 HIATAL HERNIA WITH GERD: ICD-10-CM

## 2025-05-18 NOTE — TELEPHONE ENCOUNTER
Care Due:                  Date            Visit Type   Department     Provider  --------------------------------------------------------------------------------                                EP -                              PRIMARY      [facility] FAMILY  Last Visit: 12-      CARE ([facility])   MEDICINE       Arslan Nicholson  Next Visit: None Scheduled  None         None Found                                                            Last  Test          Frequency    Reason                     Performed    Due Date  --------------------------------------------------------------------------------    Cr..........  12 months..  venlafaxine..............  08-   08-    [facility] Embedded Care Due Messages. Reference number: [id number].   5/18/2025 8:32:03 AM CDT

## 2025-05-19 NOTE — TELEPHONE ENCOUNTER
Refill Routing Note   Medication(s) are not appropriate for processing by [facility] for the following reason(s):        Outside of protocol    [facility] action(s):  Route               Appointments  past 12m or future 3m with PCP    Date Provider   Last Visit   12/26/2024 Arslan Nicholson MD   Next Visit   Visit date not found Arslan Nicholson MD   ED visits in past 90 days: 0        Note composed:6:26 AM 05/19/2025

## 2025-05-20 RX ORDER — DEXLANSOPRAZOLE 60 MG/1
1 CAPSULE, DELAYED RELEASE ORAL
Qty: 90 CAPSULE | Refills: 1 | Status: SHIPPED | OUTPATIENT
Start: 2025-05-20

## 2025-06-22 DIAGNOSIS — F32.1 CURRENT MODERATE EPISODE OF MAJOR DEPRESSIVE DISORDER WITHOUT PRIOR EPISODE: ICD-10-CM

## 2025-06-22 RX ORDER — VENLAFAXINE HYDROCHLORIDE 75 MG/1
75 CAPSULE, EXTENDED RELEASE ORAL
Qty: 90 CAPSULE | Refills: 0 | Status: SHIPPED | OUTPATIENT
Start: 2025-06-22

## 2025-06-22 NOTE — TELEPHONE ENCOUNTER
No care due was identified.  [facility] Embedded Care Due Messages. Reference number: [id number].   6/22/2025 1:55:14 PM CDT

## 2025-06-23 NOTE — TELEPHONE ENCOUNTER
Refill Decision Note   Florence Hinkle  is requesting a refill authorization.  Brief Assessment and Rationale for Refill:  Approve     Medication Therapy Plan:         Comments:     Note composed:9:03 PM 06/22/2025             Appointments     Last Visit   12/26/2024 Arslan Nicholson MD   Next Visit   Visit date not found Arslan Nicholson MD

## (undated) DEVICE — SLEEVE LITE DEVON

## (undated) DEVICE — CAUTERY BOVIE PENCIL

## (undated) DEVICE — Device

## (undated) DEVICE — TUBING SMOKE EVACUATOR LEEP

## (undated) DEVICE — SEE MEDLINE ITEM 152622

## (undated) DEVICE — PACK SET UP